# Patient Record
Sex: MALE | Race: BLACK OR AFRICAN AMERICAN | Employment: UNEMPLOYED | URBAN - METROPOLITAN AREA
[De-identification: names, ages, dates, MRNs, and addresses within clinical notes are randomized per-mention and may not be internally consistent; named-entity substitution may affect disease eponyms.]

---

## 2018-12-20 ENCOUNTER — ANESTHESIA EVENT (OUTPATIENT)
Dept: SURGERY | Age: 73
DRG: 240 | End: 2018-12-20
Payer: MEDICARE

## 2018-12-21 ENCOUNTER — HOSPITAL ENCOUNTER (INPATIENT)
Age: 73
LOS: 4 days | Discharge: NURSING, REHAB OR LONG-TERM CARE | DRG: 240 | End: 2018-12-25
Attending: SURGERY | Admitting: SURGERY
Payer: MEDICARE

## 2018-12-21 ENCOUNTER — ANESTHESIA (OUTPATIENT)
Dept: SURGERY | Age: 73
DRG: 240 | End: 2018-12-21
Payer: MEDICARE

## 2018-12-21 DIAGNOSIS — I70.229 CRITICAL LOWER LIMB ISCHEMIA (HCC): Primary | ICD-10-CM

## 2018-12-21 LAB
ANION GAP SERPL CALC-SCNC: 11 MMOL/L (ref 3–18)
ATRIAL RATE: 86 BPM
BASOPHILS # BLD: 0 K/UL (ref 0–0.1)
BASOPHILS NFR BLD: 0 % (ref 0–2)
BUN BLD-MCNC: 29 MG/DL (ref 7–18)
BUN SERPL-MCNC: 28 MG/DL (ref 7–18)
BUN/CREAT SERPL: 20 (ref 12–20)
CALCIUM SERPL-MCNC: 8.9 MG/DL (ref 8.5–10.1)
CALCULATED P AXIS, ECG09: 63 DEGREES
CALCULATED R AXIS, ECG10: -50 DEGREES
CALCULATED T AXIS, ECG11: 22 DEGREES
CHLORIDE BLD-SCNC: 94 MMOL/L (ref 100–108)
CHLORIDE SERPL-SCNC: 94 MMOL/L (ref 100–108)
CO2 SERPL-SCNC: 22 MMOL/L (ref 21–32)
CREAT SERPL-MCNC: 1.43 MG/DL (ref 0.6–1.3)
DIAGNOSIS, 93000: NORMAL
DIFFERENTIAL METHOD BLD: ABNORMAL
EOSINOPHIL # BLD: 0 K/UL (ref 0–0.4)
EOSINOPHIL NFR BLD: 0 % (ref 0–5)
ERYTHROCYTE [DISTWIDTH] IN BLOOD BY AUTOMATED COUNT: 12.9 % (ref 11.6–14.5)
GLUCOSE BLD STRIP.AUTO-MCNC: 103 MG/DL (ref 74–106)
GLUCOSE BLD STRIP.AUTO-MCNC: 111 MG/DL (ref 70–110)
GLUCOSE BLD STRIP.AUTO-MCNC: 116 MG/DL (ref 70–110)
GLUCOSE SERPL-MCNC: 97 MG/DL (ref 74–99)
HBA1C MFR BLD: 5.6 % (ref 4.2–5.6)
HCT VFR BLD AUTO: 31.2 % (ref 36–48)
HCT VFR BLD CALC: 32 % (ref 36–49)
HGB BLD-MCNC: 10.9 G/DL (ref 12–16)
HGB BLD-MCNC: 10.9 G/DL (ref 13–16)
LYMPHOCYTES # BLD: 1.6 K/UL (ref 0.9–3.6)
LYMPHOCYTES NFR BLD: 14 % (ref 21–52)
MCH RBC QN AUTO: 31.9 PG (ref 24–34)
MCHC RBC AUTO-ENTMCNC: 34.9 G/DL (ref 31–37)
MCV RBC AUTO: 91.2 FL (ref 74–97)
MONOCYTES # BLD: 1.4 K/UL (ref 0.05–1.2)
MONOCYTES NFR BLD: 12 % (ref 3–10)
NEUTS SEG # BLD: 8.5 K/UL (ref 1.8–8)
NEUTS SEG NFR BLD: 74 % (ref 40–73)
P-R INTERVAL, ECG05: 192 MS
PLATELET # BLD AUTO: 297 K/UL (ref 135–420)
PMV BLD AUTO: 8.9 FL (ref 9.2–11.8)
POTASSIUM BLD-SCNC: 4.1 MMOL/L (ref 3.5–5.5)
POTASSIUM SERPL-SCNC: 4.3 MMOL/L (ref 3.5–5.5)
Q-T INTERVAL, ECG07: 402 MS
QRS DURATION, ECG06: 104 MS
QTC CALCULATION (BEZET), ECG08: 481 MS
RBC # BLD AUTO: 3.42 M/UL (ref 4.7–5.5)
SODIUM BLD-SCNC: 128 MMOL/L (ref 136–145)
SODIUM SERPL-SCNC: 127 MMOL/L (ref 136–145)
VENTRICULAR RATE, ECG03: 86 BPM
WBC # BLD AUTO: 11.5 K/UL (ref 4.6–13.2)

## 2018-12-21 PROCEDURE — 76060000034 HC ANESTHESIA 1.5 TO 2 HR: Performed by: SURGERY

## 2018-12-21 PROCEDURE — 77030002996 HC SUT SLK J&J -A: Performed by: SURGERY

## 2018-12-21 PROCEDURE — 93005 ELECTROCARDIOGRAM TRACING: CPT

## 2018-12-21 PROCEDURE — 88311 DECALCIFY TISSUE: CPT

## 2018-12-21 PROCEDURE — 74011250636 HC RX REV CODE- 250/636: Performed by: NURSE ANESTHETIST, CERTIFIED REGISTERED

## 2018-12-21 PROCEDURE — 0Y6D0Z3 DETACHMENT AT LEFT UPPER LEG, LOW, OPEN APPROACH: ICD-10-PCS | Performed by: SURGERY

## 2018-12-21 PROCEDURE — 74011250636 HC RX REV CODE- 250/636: Performed by: SURGERY

## 2018-12-21 PROCEDURE — 77030008462 HC STPLR SKN PROX J&J -A: Performed by: SURGERY

## 2018-12-21 PROCEDURE — 77030011267 HC ELECTRD BLD COVD -A: Performed by: SURGERY

## 2018-12-21 PROCEDURE — 88307 TISSUE EXAM BY PATHOLOGIST: CPT

## 2018-12-21 PROCEDURE — 74011250636 HC RX REV CODE- 250/636

## 2018-12-21 PROCEDURE — 74011000272 HC RX REV CODE- 272: Performed by: SURGERY

## 2018-12-21 PROCEDURE — 84295 ASSAY OF SERUM SODIUM: CPT

## 2018-12-21 PROCEDURE — 77030029099 HC BN WAX SSPC -A: Performed by: SURGERY

## 2018-12-21 PROCEDURE — 65270000029 HC RM PRIVATE

## 2018-12-21 PROCEDURE — 76210000016 HC OR PH I REC 1 TO 1.5 HR: Performed by: SURGERY

## 2018-12-21 PROCEDURE — 74011250636 HC RX REV CODE- 250/636: Performed by: ANESTHESIOLOGY

## 2018-12-21 PROCEDURE — 85025 COMPLETE CBC W/AUTO DIFF WBC: CPT

## 2018-12-21 PROCEDURE — 36415 COLL VENOUS BLD VENIPUNCTURE: CPT

## 2018-12-21 PROCEDURE — 77030031139 HC SUT VCRL2 J&J -A: Performed by: SURGERY

## 2018-12-21 PROCEDURE — 74011250637 HC RX REV CODE- 250/637: Performed by: SURGERY

## 2018-12-21 PROCEDURE — 74011250637 HC RX REV CODE- 250/637: Performed by: NURSE ANESTHETIST, CERTIFIED REGISTERED

## 2018-12-21 PROCEDURE — 74011000250 HC RX REV CODE- 250: Performed by: SURGERY

## 2018-12-21 PROCEDURE — 74011250637 HC RX REV CODE- 250/637: Performed by: NURSE PRACTITIONER

## 2018-12-21 PROCEDURE — 77030027138 HC INCENT SPIROMETER -A

## 2018-12-21 PROCEDURE — 82962 GLUCOSE BLOOD TEST: CPT

## 2018-12-21 PROCEDURE — 77030018836 HC SOL IRR NACL ICUM -A: Performed by: SURGERY

## 2018-12-21 PROCEDURE — 77030032490 HC SLV COMPR SCD KNE COVD -B: Performed by: SURGERY

## 2018-12-21 PROCEDURE — 80048 BASIC METABOLIC PNL TOTAL CA: CPT

## 2018-12-21 PROCEDURE — 76010000153 HC OR TIME 1.5 TO 2 HR: Performed by: SURGERY

## 2018-12-21 PROCEDURE — 77030012510 HC MSK AIRWY LMA TELE -B: Performed by: ANESTHESIOLOGY

## 2018-12-21 PROCEDURE — 83036 HEMOGLOBIN GLYCOSYLATED A1C: CPT

## 2018-12-21 RX ORDER — DEXTROSE MONOHYDRATE 25 G/50ML
25-50 INJECTION, SOLUTION INTRAVENOUS AS NEEDED
Status: DISCONTINUED | OUTPATIENT
Start: 2018-12-21 | End: 2018-12-21 | Stop reason: SDUPTHER

## 2018-12-21 RX ORDER — CEFAZOLIN SODIUM 2 G/50ML
2 SOLUTION INTRAVENOUS
Status: COMPLETED | OUTPATIENT
Start: 2018-12-21 | End: 2018-12-21

## 2018-12-21 RX ORDER — SODIUM CHLORIDE, SODIUM LACTATE, POTASSIUM CHLORIDE, CALCIUM CHLORIDE 600; 310; 30; 20 MG/100ML; MG/100ML; MG/100ML; MG/100ML
INJECTION, SOLUTION INTRAVENOUS
Status: DISCONTINUED | OUTPATIENT
Start: 2018-12-21 | End: 2018-12-21 | Stop reason: HOSPADM

## 2018-12-21 RX ORDER — PHENYTOIN SODIUM 100 MG/1
100 CAPSULE, EXTENDED RELEASE ORAL DAILY
Status: DISCONTINUED | OUTPATIENT
Start: 2018-12-22 | End: 2018-12-21

## 2018-12-21 RX ORDER — PROPOFOL 10 MG/ML
INJECTION, EMULSION INTRAVENOUS AS NEEDED
Status: DISCONTINUED | OUTPATIENT
Start: 2018-12-21 | End: 2018-12-21 | Stop reason: HOSPADM

## 2018-12-21 RX ORDER — CALCIUM CARBONATE 200(500)MG
200 TABLET,CHEWABLE ORAL
Status: DISCONTINUED | OUTPATIENT
Start: 2018-12-21 | End: 2018-12-22 | Stop reason: RX

## 2018-12-21 RX ORDER — INSULIN LISPRO 100 [IU]/ML
INJECTION, SOLUTION INTRAVENOUS; SUBCUTANEOUS
Status: DISCONTINUED | OUTPATIENT
Start: 2018-12-21 | End: 2018-12-25 | Stop reason: HOSPADM

## 2018-12-21 RX ORDER — HYDROMORPHONE HYDROCHLORIDE 2 MG/ML
0.5 INJECTION, SOLUTION INTRAMUSCULAR; INTRAVENOUS; SUBCUTANEOUS
Status: DISCONTINUED | OUTPATIENT
Start: 2018-12-21 | End: 2018-12-21

## 2018-12-21 RX ORDER — OXYCODONE AND ACETAMINOPHEN 5; 325 MG/1; MG/1
1 TABLET ORAL
Status: DISCONTINUED | OUTPATIENT
Start: 2018-12-21 | End: 2018-12-25 | Stop reason: HOSPADM

## 2018-12-21 RX ORDER — GABAPENTIN 400 MG/1
400 CAPSULE ORAL 3 TIMES DAILY
Status: DISCONTINUED | OUTPATIENT
Start: 2018-12-21 | End: 2018-12-22 | Stop reason: RX

## 2018-12-21 RX ORDER — MAGNESIUM SULFATE 100 %
4 CRYSTALS MISCELLANEOUS AS NEEDED
Status: DISCONTINUED | OUTPATIENT
Start: 2018-12-21 | End: 2018-12-21 | Stop reason: HOSPADM

## 2018-12-21 RX ORDER — OXYCODONE AND ACETAMINOPHEN 7.5; 325 MG/1; MG/1
TABLET ORAL
COMMUNITY
End: 2018-12-25

## 2018-12-21 RX ORDER — OXYCODONE AND ACETAMINOPHEN 5; 325 MG/1; MG/1
2 TABLET ORAL
Status: DISCONTINUED | OUTPATIENT
Start: 2018-12-21 | End: 2018-12-25 | Stop reason: HOSPADM

## 2018-12-21 RX ORDER — HYDROMORPHONE HYDROCHLORIDE 2 MG/ML
0.5 INJECTION, SOLUTION INTRAMUSCULAR; INTRAVENOUS; SUBCUTANEOUS
Status: DISCONTINUED | OUTPATIENT
Start: 2018-12-21 | End: 2018-12-21 | Stop reason: HOSPADM

## 2018-12-21 RX ORDER — CARVEDILOL 25 MG/1
25 TABLET ORAL 2 TIMES DAILY WITH MEALS
Status: DISCONTINUED | OUTPATIENT
Start: 2018-12-22 | End: 2018-12-22 | Stop reason: RX

## 2018-12-21 RX ORDER — ALLOPURINOL 100 MG/1
100 TABLET ORAL DAILY
Status: DISCONTINUED | OUTPATIENT
Start: 2018-12-22 | End: 2018-12-22 | Stop reason: RX

## 2018-12-21 RX ORDER — FUROSEMIDE 20 MG/1
TABLET ORAL DAILY
COMMUNITY

## 2018-12-21 RX ORDER — CAPSAICIN 0.03 G/100G
CREAM TOPICAL 3 TIMES DAILY
COMMUNITY

## 2018-12-21 RX ORDER — CARVEDILOL 25 MG/1
25 TABLET ORAL 2 TIMES DAILY WITH MEALS
COMMUNITY

## 2018-12-21 RX ORDER — ATORVASTATIN CALCIUM 40 MG/1
40 TABLET, FILM COATED ORAL
Status: DISCONTINUED | OUTPATIENT
Start: 2018-12-22 | End: 2018-12-21

## 2018-12-21 RX ORDER — LABETALOL HYDROCHLORIDE 5 MG/ML
10 INJECTION, SOLUTION INTRAVENOUS AS NEEDED
Status: DISCONTINUED | OUTPATIENT
Start: 2018-12-21 | End: 2018-12-21 | Stop reason: HOSPADM

## 2018-12-21 RX ORDER — AMLODIPINE BESYLATE 10 MG/1
10 TABLET ORAL DAILY
Status: DISCONTINUED | OUTPATIENT
Start: 2018-12-22 | End: 2018-12-22 | Stop reason: RX

## 2018-12-21 RX ORDER — MAGNESIUM SULFATE 100 %
4 CRYSTALS MISCELLANEOUS AS NEEDED
Status: DISCONTINUED | OUTPATIENT
Start: 2018-12-21 | End: 2018-12-21 | Stop reason: SDUPTHER

## 2018-12-21 RX ORDER — PHENYTOIN SODIUM 100 MG/1
100 CAPSULE, EXTENDED RELEASE ORAL 3 TIMES DAILY
Status: DISCONTINUED | OUTPATIENT
Start: 2018-12-21 | End: 2018-12-22 | Stop reason: RX

## 2018-12-21 RX ORDER — LANOLIN ALCOHOL/MO/W.PET/CERES
1 CREAM (GRAM) TOPICAL
COMMUNITY

## 2018-12-21 RX ORDER — FUROSEMIDE 20 MG/1
20 TABLET ORAL DAILY
Status: DISCONTINUED | OUTPATIENT
Start: 2018-12-22 | End: 2018-12-22 | Stop reason: RX

## 2018-12-21 RX ORDER — OXYCODONE AND ACETAMINOPHEN 5; 325 MG/1; MG/1
1 TABLET ORAL AS NEEDED
Status: DISCONTINUED | OUTPATIENT
Start: 2018-12-21 | End: 2018-12-21 | Stop reason: HOSPADM

## 2018-12-21 RX ORDER — DEXTROSE MONOHYDRATE 25 G/50ML
25-50 INJECTION, SOLUTION INTRAVENOUS AS NEEDED
Status: DISCONTINUED | OUTPATIENT
Start: 2018-12-21 | End: 2018-12-21 | Stop reason: HOSPADM

## 2018-12-21 RX ORDER — ONDANSETRON 2 MG/ML
4 INJECTION INTRAMUSCULAR; INTRAVENOUS
Status: DISCONTINUED | OUTPATIENT
Start: 2018-12-21 | End: 2018-12-21 | Stop reason: HOSPADM

## 2018-12-21 RX ORDER — SODIUM CHLORIDE, SODIUM LACTATE, POTASSIUM CHLORIDE, CALCIUM CHLORIDE 600; 310; 30; 20 MG/100ML; MG/100ML; MG/100ML; MG/100ML
50 INJECTION, SOLUTION INTRAVENOUS CONTINUOUS
Status: DISCONTINUED | OUTPATIENT
Start: 2018-12-21 | End: 2018-12-21 | Stop reason: HOSPADM

## 2018-12-21 RX ORDER — PHENYTOIN SODIUM 100 MG/1
100 CAPSULE, EXTENDED RELEASE ORAL 3 TIMES DAILY
Status: DISCONTINUED | OUTPATIENT
Start: 2018-12-21 | End: 2018-12-21

## 2018-12-21 RX ORDER — CLOPIDOGREL BISULFATE 75 MG/1
TABLET ORAL
COMMUNITY

## 2018-12-21 RX ORDER — GUAIFENESIN 600 MG/1
600 TABLET, EXTENDED RELEASE ORAL EVERY 12 HOURS
Status: DISCONTINUED | OUTPATIENT
Start: 2018-12-21 | End: 2018-12-22 | Stop reason: RX

## 2018-12-21 RX ORDER — DIPHENHYDRAMINE HYDROCHLORIDE 50 MG/ML
25 INJECTION, SOLUTION INTRAMUSCULAR; INTRAVENOUS
Status: DISCONTINUED | OUTPATIENT
Start: 2018-12-21 | End: 2018-12-21 | Stop reason: HOSPADM

## 2018-12-21 RX ORDER — FENTANYL CITRATE 50 UG/ML
INJECTION, SOLUTION INTRAMUSCULAR; INTRAVENOUS AS NEEDED
Status: DISCONTINUED | OUTPATIENT
Start: 2018-12-21 | End: 2018-12-21 | Stop reason: HOSPADM

## 2018-12-21 RX ORDER — HYDROMORPHONE HYDROCHLORIDE 1 MG/ML
INJECTION, SOLUTION INTRAMUSCULAR; INTRAVENOUS; SUBCUTANEOUS AS NEEDED
Status: DISCONTINUED | OUTPATIENT
Start: 2018-12-21 | End: 2018-12-21 | Stop reason: HOSPADM

## 2018-12-21 RX ORDER — GABAPENTIN 400 MG/1
400 CAPSULE ORAL DAILY
COMMUNITY

## 2018-12-21 RX ORDER — LABETALOL HCL 20 MG/4 ML
SYRINGE (ML) INTRAVENOUS
Status: COMPLETED
Start: 2018-12-21 | End: 2018-12-21

## 2018-12-21 RX ORDER — ONDANSETRON 2 MG/ML
INJECTION INTRAMUSCULAR; INTRAVENOUS AS NEEDED
Status: DISCONTINUED | OUTPATIENT
Start: 2018-12-21 | End: 2018-12-21 | Stop reason: HOSPADM

## 2018-12-21 RX ORDER — TAMSULOSIN HYDROCHLORIDE 0.4 MG/1
0.4 CAPSULE ORAL DAILY
Status: DISCONTINUED | OUTPATIENT
Start: 2018-12-22 | End: 2018-12-22 | Stop reason: RX

## 2018-12-21 RX ORDER — FAMOTIDINE 20 MG/1
20 TABLET, FILM COATED ORAL ONCE
Status: COMPLETED | OUTPATIENT
Start: 2018-12-21 | End: 2018-12-21

## 2018-12-21 RX ORDER — GABAPENTIN 400 MG/1
400 CAPSULE ORAL 3 TIMES DAILY
Status: DISCONTINUED | OUTPATIENT
Start: 2018-12-21 | End: 2018-12-21

## 2018-12-21 RX ORDER — ERGOCALCIFEROL 1.25 MG/1
50000 CAPSULE ORAL
COMMUNITY

## 2018-12-21 RX ORDER — LANOLIN ALCOHOL/MO/W.PET/CERES
1 CREAM (GRAM) TOPICAL
Status: DISCONTINUED | OUTPATIENT
Start: 2018-12-22 | End: 2018-12-22 | Stop reason: RX

## 2018-12-21 RX ORDER — AMLODIPINE BESYLATE 5 MG/1
5 TABLET ORAL DAILY
Status: DISCONTINUED | OUTPATIENT
Start: 2018-12-22 | End: 2018-12-21

## 2018-12-21 RX ORDER — ALLOPURINOL 100 MG/1
TABLET ORAL DAILY
COMMUNITY

## 2018-12-21 RX ORDER — IPRATROPIUM BROMIDE AND ALBUTEROL SULFATE 2.5; .5 MG/3ML; MG/3ML
3 SOLUTION RESPIRATORY (INHALATION)
Status: DISCONTINUED | OUTPATIENT
Start: 2018-12-21 | End: 2018-12-25 | Stop reason: HOSPADM

## 2018-12-21 RX ORDER — FENTANYL CITRATE 50 UG/ML
25 INJECTION, SOLUTION INTRAMUSCULAR; INTRAVENOUS
Status: DISCONTINUED | OUTPATIENT
Start: 2018-12-21 | End: 2018-12-21 | Stop reason: HOSPADM

## 2018-12-21 RX ORDER — INSULIN LISPRO 100 [IU]/ML
INJECTION, SOLUTION INTRAVENOUS; SUBCUTANEOUS ONCE
Status: DISCONTINUED | OUTPATIENT
Start: 2018-12-21 | End: 2018-12-21 | Stop reason: SDUPTHER

## 2018-12-21 RX ORDER — INSULIN LISPRO 100 [IU]/ML
INJECTION, SOLUTION INTRAVENOUS; SUBCUTANEOUS ONCE
Status: DISCONTINUED | OUTPATIENT
Start: 2018-12-21 | End: 2018-12-21 | Stop reason: HOSPADM

## 2018-12-21 RX ORDER — HYDRALAZINE HYDROCHLORIDE 25 MG/1
25 TABLET, FILM COATED ORAL 3 TIMES DAILY
Status: DISCONTINUED | OUTPATIENT
Start: 2018-12-22 | End: 2018-12-22 | Stop reason: RX

## 2018-12-21 RX ORDER — DOCUSATE SODIUM 100 MG/1
100 CAPSULE, LIQUID FILLED ORAL DAILY
Status: DISCONTINUED | OUTPATIENT
Start: 2018-12-22 | End: 2018-12-22 | Stop reason: RX

## 2018-12-21 RX ORDER — LEVETIRACETAM 500 MG/1
500 TABLET ORAL 2 TIMES DAILY
Status: DISCONTINUED | OUTPATIENT
Start: 2018-12-22 | End: 2018-12-21

## 2018-12-21 RX ORDER — POTASSIUM CITRATE 10 MEQ/1
TABLET, EXTENDED RELEASE ORAL
COMMUNITY

## 2018-12-21 RX ORDER — MEMANTINE HYDROCHLORIDE 5 MG/1
10 TABLET ORAL DAILY
Status: DISCONTINUED | OUTPATIENT
Start: 2018-12-22 | End: 2018-12-22 | Stop reason: RX

## 2018-12-21 RX ORDER — LIDOCAINE HYDROCHLORIDE 20 MG/ML
INJECTION, SOLUTION EPIDURAL; INFILTRATION; INTRACAUDAL; PERINEURAL AS NEEDED
Status: DISCONTINUED | OUTPATIENT
Start: 2018-12-21 | End: 2018-12-21 | Stop reason: HOSPADM

## 2018-12-21 RX ORDER — ATORVASTATIN CALCIUM 10 MG/1
20 TABLET, FILM COATED ORAL
Status: DISCONTINUED | OUTPATIENT
Start: 2018-12-22 | End: 2018-12-22 | Stop reason: RX

## 2018-12-21 RX ORDER — MEMANTINE HYDROCHLORIDE 10 MG/1
TABLET ORAL DAILY
COMMUNITY

## 2018-12-21 RX ADMIN — HYDROMORPHONE HYDROCHLORIDE 0.5 MG: 2 INJECTION INTRAMUSCULAR; INTRAVENOUS; SUBCUTANEOUS at 18:55

## 2018-12-21 RX ADMIN — OXYCODONE AND ACETAMINOPHEN 2 TABLET: 5; 325 TABLET ORAL at 22:07

## 2018-12-21 RX ADMIN — SODIUM CHLORIDE, SODIUM LACTATE, POTASSIUM CHLORIDE, AND CALCIUM CHLORIDE 50 ML/HR: 600; 310; 30; 20 INJECTION, SOLUTION INTRAVENOUS at 10:33

## 2018-12-21 RX ADMIN — SODIUM CHLORIDE, SODIUM LACTATE, POTASSIUM CHLORIDE, CALCIUM CHLORIDE: 600; 310; 30; 20 INJECTION, SOLUTION INTRAVENOUS at 17:25

## 2018-12-21 RX ADMIN — FENTANYL CITRATE 50 MCG: 50 INJECTION, SOLUTION INTRAMUSCULAR; INTRAVENOUS at 17:23

## 2018-12-21 RX ADMIN — CEFAZOLIN SODIUM 2 G: 2 SOLUTION INTRAVENOUS at 17:06

## 2018-12-21 RX ADMIN — LABETALOL 20 MG/4 ML (5 MG/ML) INTRAVENOUS SYRINGE 10 MG: at 19:08

## 2018-12-21 RX ADMIN — ONDANSETRON 4 MG: 2 INJECTION INTRAMUSCULAR; INTRAVENOUS at 18:00

## 2018-12-21 RX ADMIN — GUAIFENESIN 600 MG: 600 TABLET, EXTENDED RELEASE ORAL at 22:06

## 2018-12-21 RX ADMIN — HYDROMORPHONE HYDROCHLORIDE 0.5 MG: 1 INJECTION, SOLUTION INTRAMUSCULAR; INTRAVENOUS; SUBCUTANEOUS at 17:26

## 2018-12-21 RX ADMIN — HYDROMORPHONE HYDROCHLORIDE 0.5 MG: 2 INJECTION INTRAMUSCULAR; INTRAVENOUS; SUBCUTANEOUS at 18:45

## 2018-12-21 RX ADMIN — DIPHENHYDRAMINE HYDROCHLORIDE 25 MG: 50 INJECTION, SOLUTION INTRAMUSCULAR; INTRAVENOUS at 18:37

## 2018-12-21 RX ADMIN — LIDOCAINE HYDROCHLORIDE 40 MG: 20 INJECTION, SOLUTION EPIDURAL; INFILTRATION; INTRACAUDAL; PERINEURAL at 16:59

## 2018-12-21 RX ADMIN — HYDROMORPHONE HYDROCHLORIDE 0.5 MG: 2 INJECTION INTRAMUSCULAR; INTRAVENOUS; SUBCUTANEOUS at 18:35

## 2018-12-21 RX ADMIN — HYDROMORPHONE HYDROCHLORIDE 0.5 MG: 1 INJECTION, SOLUTION INTRAMUSCULAR; INTRAVENOUS; SUBCUTANEOUS at 17:40

## 2018-12-21 RX ADMIN — PROPOFOL 150 MG: 10 INJECTION, EMULSION INTRAVENOUS at 16:59

## 2018-12-21 RX ADMIN — FENTANYL CITRATE 50 MCG: 50 INJECTION, SOLUTION INTRAMUSCULAR; INTRAVENOUS at 16:59

## 2018-12-21 RX ADMIN — FAMOTIDINE 20 MG: 20 TABLET ORAL at 10:42

## 2018-12-21 NOTE — CONSULTS
2 [facility]  Hospitalist Division    Consult Note    Patient: Lennie Gates MRN: [id number]  [facility]: [id number]    YOB: 1945  Age: 74 y.o. Sex: male    DOA: 12/21/2018 LOS:  LOS: 0 days        Requesting Physician:  Dr. Ruth Person   Reason for Consultation:  Medical Management    Chief Complaint:  L BKA vs L AKA     Assessment/Plan     Patient Active Problem List   Diagnosis Code    PAD (peripheral artery disease) ([facility] I73.9       A/P:    L BKA vs. L AKA   -defer primary  -pain control  -PT  -ICS     DM II  -accuchecks  -SSI  -lantus (add tomorrow if needed)   -diabetic diet when PO able     HTN   -monitor BP  -home medications restart tomorrow    Hyperlipidemia  -statin     PAD  -restart plavix per surgery      Seizures   -home medications  -seizure precautions  -monitor    Tobacco abuse  -advise cessation   -nicoderm patch if needed     DVT px   -defer primary    -We appreciate the consultation for medical management and appreciate being able to be involved with their care during hospitalization. HPI:     Lennie Gates is a 74 y.o. male who presents to the hospital to undergo an elective L BKA vs L AKA per vascular surgery. We were consulted to assist in medical   Management while the patient is hospitalized.      Past Medical History:   Diagnosis Date    Anemia     Arthropathy     BPH (benign prostatic hypertrophy)     Diabetes mellitus (HCC)     Difficulty urinating     DVT (deep venous thrombosis) (HCC)     Elevated cholesterol     Esophageal reflux     GERD (gastroesophageal reflux disease)     Gout     Heartburn     Hypercholesteremia     Hyperlipidemia     Hypertension     Hypertrophy of prostate with urinary obstruction and other lower urinary tract symptoms (LUTS)     Hyponatremia     Loose, teeth     Nocturia     PAD (peripheral artery disease) (HCC)     Peptic ulcer of stomach     Pneumonia     Prostate disease     Seizures ([facility]     Unspecified epilepsy without mention of intractable epilepsy        Past Surgical History:   Procedure Laterality Date    HX CATARACT REMOVAL      HX COLONOSCOPY      HX CYST REMOVAL      HX OTHER SURGICAL  04/11/2008 - Dr. Deng Flores    lower extremity bypass surgery    HX SMALL BOWEL RESECTION         Family History   Problem Relation Age of Onset    Cancer Mother        Social History     Socioeconomic History    Marital status: SINGLE     Spouse name: Not on file    Number of children: Not on file    Years of education: Not on file    Highest education level: Not on file   Tobacco Use    Smoking status: Current Every Day Smoker     Packs/day: 0.30     Types: Cigarettes    Smokeless tobacco: Never Used   Substance and Sexual Activity    Alcohol use: No    Drug use: No       Prior to Admission medications    Medication Sig Start Date End Date Taking? Authorizing Provider   allopurinol (ZYLOPRIM) 100 mg tablet Take  by mouth daily. Yes Provider, Historical   calcium citrate-vitamin d3 (CITRACAL+D) 315-200 mg-unit tab Take 1 Tab by mouth daily (with breakfast). Yes Provider, Historical   capsicum oleoresin 0.025 % topical cream Apply  to affected area three (3) times daily. Yes Provider, Historical   carvedilol (COREG) 25 mg tablet Take 25 mg by mouth two (2) times daily (with meals). Yes Provider, Historical   docusate sodium (COLACE) 50 mg capsule Take 50 mg by mouth two (2) times a day. Yes Provider, Historical   ergocalciferol (VITAMIN D2) 50,000 unit capsule Take 50,000 Units by mouth. Yes Provider, Historical   umeclidinium (INCRUSE ELLIPTA) 62.5 mcg/actuation inhaler Take 1 Puff by inhalation daily. Yes Provider, Historical   oxyCODONE-acetaminophen (PERCOCET) 7.5-325 mg per tablet Take  by mouth. Yes Provider, Historical   furosemide (LASIX) 20 mg tablet Take  by mouth daily. Yes Provider, Historical   memantine (NAMENDA) 10 mg tablet Take  by mouth daily. Yes Provider, Historical   gabapentin (NEURONTIN) 400 mg capsule Take 400 mg by mouth three (3) times daily. Yes Provider, Historical   clopidogrel (PLAVIX) 75 mg tab Take  by mouth. Yes Provider, Historical   potassium citrate (UROCIT-K10) 10 mEq (1,080 mg) TbER Take  by mouth. Yes Provider, Historical   tamsulosin (FLOMAX) 0.4 mg capsule Take 1 Cap by mouth daily. 7/25/13  Yes Antony Salamanca MD   colchicine (COLCRYS) 0.6 mg tablet Take 0.6 mg by mouth daily. Yes Provider, Historical   atorvastatin (LIPITOR) 40 mg tablet Take  by mouth daily. Yes Provider, Historical   phenytoin ER (DILANTIN) 100 mg ER capsule Take  by mouth. Yes Provider, Historical   hydrALAZINE (APRESOLINE) 25 mg tablet Take 25 mg by mouth three (3) times daily. Yes Provider, Historical   ranitidine (ZANTAC) 150 mg tablet Take 150 mg by mouth two (2) times a day. Yes Provider, Historical   levETIRAcetam (KEPPRA) 500 mg tablet Take  by mouth two (2) times a day. Yes Provider, Historical   amLODIPine (NORVASC) 5 mg tablet Take 5 mg by mouth daily. Provider, Historical   ferrous sulfate (FEOSOL) 325 mg (65 mg iron) tablet Take  by mouth Daily (before breakfast).       Provider, Historical       No Known Allergies    Review of Systems  - fever, - chills, - fatigue, - weight loss, - night sweats   - sore throat, - sinus congestion, - lymphadenopathy, - vision changes  - CP, -  palpitations  - dyspnea on exertion, - dyspnea at rest, - cough, - hemoptysis  - nausea, - vomiting, - diarrhea, - abdominal pain, - reflux, - dysphagia  - dysuria, - hematuria, - urinary frequency  - rash, - pruritis  - back pain, - neck pain, - myalgia, - arthralgia + leg pain L   - H/A, + numbness, + tingling, - weakness, - slurred speech    Physical Exam:      Visit Vitals  /77 (BP 1 Location: Left arm, BP Patient Position: At rest)   Pulse 87   Temp 97.7 °F (36.5 °C)   Resp 16   Ht 5' 4\" (1.626 m)   Wt 67.4 kg (148 lb 8 oz)   SpO2 100%   BMI 25.49 kg/m²       Physical Exam:  Gen: In general, this is a well nourished male in no acute distress. HEENT: Sclerae nonicteric. Oral mucous membranes moist.    Neck: Supple with midline trachea. CV: RRR without murmur or rub appreciated. Resp:Respirations are unlabored without use of accessory muscles. Lung fields bilaterally without wheezes or rhonchi. Abd: Soft, nontender, nondistended. Normoactive bowel sounds. Extrem: No pitting pretibial edema. Skin: Warm, no visible rashes. Neuro: Patient is alert, oriented, and cooperative. No obvious focal defects. Moves all 4 extremities. Labs Reviewed:    Recent Results (from the past 24 hour(s))   EKG, 12 LEAD, INITIAL    Collection Time: 12/21/18 10:12 AM   Result Value Ref Range    Ventricular Rate 86 BPM    Atrial Rate 86 BPM    P-R Interval 192 ms    QRS Duration 104 ms    Q-T Interval 402 ms    QTC Calculation (Bezet) 481 ms    Calculated P Axis 63 degrees    Calculated R Axis -50 degrees    Calculated T Axis 22 degrees    Diagnosis       Normal sinus rhythm  Left anterior fascicular block  Minimal voltage criteria for LVH, may be normal variant  Nonspecific ST and T wave abnormality  Prolonged QT  Abnormal ECG  No previous ECGs available     CBC WITH AUTOMATED DIFF    Collection Time: 12/21/18 10:30 AM   Result Value Ref Range    WBC 11.5 4.6 - 13.2 K/uL    RBC 3.42 (L) 4.70 - 5.50 M/uL    HGB 10.9 (L) 13.0 - 16.0 g/dL    HCT 31.2 (L) 36.0 - 48.0 %    MCV 91.2 74.0 - 97.0 FL    MCH 31.9 24.0 - 34.0 PG    MCHC 34.9 31.0 - 37.0 g/dL    RDW 12.9 11.6 - 14.5 %    PLATELET 794 355 - 785 K/uL    MPV 8.9 (L) 9.2 - 11.8 FL    NEUTROPHILS 74 (H) 40 - 73 %    LYMPHOCYTES 14 (L) 21 - 52 %    MONOCYTES 12 (H) 3 - 10 %    EOSINOPHILS 0 0 - 5 %    BASOPHILS 0 0 - 2 %    ABS. NEUTROPHILS 8.5 (H) 1.8 - 8.0 K/UL    ABS. LYMPHOCYTES 1.6 0.9 - 3.6 K/UL    ABS. MONOCYTES 1.4 (H) 0.05 - 1.2 K/UL    ABS. EOSINOPHILS 0.0 0.0 - 0.4 K/UL    ABS.  BASOPHILS 0.0 0.0 - 0.1 K/UL    DF AUTOMATED     POC 6 PLUS    Collection Time: 12/21/18 10:41 AM   Result Value Ref Range    Sodium,  (L) 136 - 145 MMOL/L    Potassium, POC 4.1 3.5 - 5.5 MMOL/L    Chloride, POC 94 (L) 100 - 108 MMOL/L    BUN, POC 29 (H) 7 - 18 MG/DL    Glucose,  74 - 106 MG/DL    Hematocrit, POC 32 (L) 36 - 49 %    Hemoglobin, POC 10.9 (L) 12 - 16 G/DL       Imaging Reviewed:

## 2018-12-21 NOTE — INTERVAL H&P NOTE
H&P Update:  Negro Jalloh was seen and examined. History and physical has been reviewed. The patient has been examined. There have been no significant clinical changes since the completion of the originally dated History and Physical. Here for L AKA.     Signed By: Grabiel Lomeli MD     December 21, 2018 3:26 PM

## 2018-12-22 PROBLEM — I10 HTN (HYPERTENSION): Status: ACTIVE | Noted: 2018-12-22

## 2018-12-22 PROBLEM — E11.9 DM (DIABETES MELLITUS) (HCC): Status: ACTIVE | Noted: 2018-12-22

## 2018-12-22 LAB
ANION GAP SERPL CALC-SCNC: 10 MMOL/L (ref 3–18)
BUN SERPL-MCNC: 15 MG/DL (ref 7–18)
BUN/CREAT SERPL: 18 (ref 12–20)
CALCIUM SERPL-MCNC: 8.1 MG/DL (ref 8.5–10.1)
CHLORIDE SERPL-SCNC: 99 MMOL/L (ref 100–108)
CO2 SERPL-SCNC: 24 MMOL/L (ref 21–32)
CREAT SERPL-MCNC: 0.82 MG/DL (ref 0.6–1.3)
ERYTHROCYTE [DISTWIDTH] IN BLOOD BY AUTOMATED COUNT: 13 % (ref 11.6–14.5)
GLUCOSE BLD STRIP.AUTO-MCNC: 106 MG/DL (ref 70–110)
GLUCOSE BLD STRIP.AUTO-MCNC: 119 MG/DL (ref 70–110)
GLUCOSE BLD STRIP.AUTO-MCNC: 130 MG/DL (ref 70–110)
GLUCOSE BLD STRIP.AUTO-MCNC: 96 MG/DL (ref 70–110)
GLUCOSE SERPL-MCNC: 86 MG/DL (ref 74–99)
HCT VFR BLD AUTO: 29.4 % (ref 36–48)
HGB BLD-MCNC: 9.9 G/DL (ref 13–16)
MCH RBC QN AUTO: 31.1 PG (ref 24–34)
MCHC RBC AUTO-ENTMCNC: 33.7 G/DL (ref 31–37)
MCV RBC AUTO: 92.5 FL (ref 74–97)
PLATELET # BLD AUTO: 278 K/UL (ref 135–420)
PMV BLD AUTO: 8.8 FL (ref 9.2–11.8)
POTASSIUM SERPL-SCNC: 4.2 MMOL/L (ref 3.5–5.5)
RBC # BLD AUTO: 3.18 M/UL (ref 4.7–5.5)
SODIUM SERPL-SCNC: 133 MMOL/L (ref 136–145)
WBC # BLD AUTO: 17.7 K/UL (ref 4.6–13.2)

## 2018-12-22 PROCEDURE — 85027 COMPLETE CBC AUTOMATED: CPT

## 2018-12-22 PROCEDURE — 77030011256 HC DRSG MEPILEX <16IN NO BORD MOLN -A

## 2018-12-22 PROCEDURE — 74011250637 HC RX REV CODE- 250/637: Performed by: SURGERY

## 2018-12-22 PROCEDURE — 80048 BASIC METABOLIC PNL TOTAL CA: CPT

## 2018-12-22 PROCEDURE — 65270000029 HC RM PRIVATE

## 2018-12-22 PROCEDURE — 77030010545

## 2018-12-22 PROCEDURE — 74011250636 HC RX REV CODE- 250/636: Performed by: HOSPITALIST

## 2018-12-22 PROCEDURE — 82962 GLUCOSE BLOOD TEST: CPT

## 2018-12-22 PROCEDURE — 36415 COLL VENOUS BLD VENIPUNCTURE: CPT

## 2018-12-22 PROCEDURE — 74011250637 HC RX REV CODE- 250/637: Performed by: NURSE PRACTITIONER

## 2018-12-22 RX ORDER — SODIUM CHLORIDE 9 MG/ML
75 INJECTION, SOLUTION INTRAVENOUS CONTINUOUS
Status: DISCONTINUED | OUTPATIENT
Start: 2018-12-22 | End: 2018-12-24

## 2018-12-22 RX ORDER — ATORVASTATIN CALCIUM 20 MG/1
20 TABLET, FILM COATED ORAL
Status: DISCONTINUED | OUTPATIENT
Start: 2018-12-23 | End: 2018-12-25 | Stop reason: HOSPADM

## 2018-12-22 RX ORDER — CALCIUM CARBONATE 200(500)MG
200 TABLET,CHEWABLE ORAL
Status: DISCONTINUED | OUTPATIENT
Start: 2018-12-22 | End: 2018-12-25 | Stop reason: HOSPADM

## 2018-12-22 RX ORDER — TAMSULOSIN HYDROCHLORIDE 0.4 MG/1
0.4 CAPSULE ORAL DAILY
Status: DISCONTINUED | OUTPATIENT
Start: 2018-12-22 | End: 2018-12-25 | Stop reason: HOSPADM

## 2018-12-22 RX ORDER — ALLOPURINOL 100 MG/1
100 TABLET ORAL DAILY
Status: DISCONTINUED | OUTPATIENT
Start: 2018-12-22 | End: 2018-12-25 | Stop reason: HOSPADM

## 2018-12-22 RX ORDER — CARVEDILOL 25 MG/1
25 TABLET ORAL 2 TIMES DAILY WITH MEALS
Status: DISCONTINUED | OUTPATIENT
Start: 2018-12-22 | End: 2018-12-22

## 2018-12-22 RX ORDER — GABAPENTIN 400 MG/1
400 CAPSULE ORAL 3 TIMES DAILY
Status: DISCONTINUED | OUTPATIENT
Start: 2018-12-22 | End: 2018-12-25 | Stop reason: HOSPADM

## 2018-12-22 RX ORDER — AMLODIPINE BESYLATE 10 MG/1
10 TABLET ORAL DAILY
Status: DISCONTINUED | OUTPATIENT
Start: 2018-12-22 | End: 2018-12-22

## 2018-12-22 RX ORDER — MEMANTINE HYDROCHLORIDE 5 MG/1
10 TABLET ORAL DAILY
Status: DISCONTINUED | OUTPATIENT
Start: 2018-12-22 | End: 2018-12-25 | Stop reason: HOSPADM

## 2018-12-22 RX ORDER — LANOLIN ALCOHOL/MO/W.PET/CERES
1 CREAM (GRAM) TOPICAL
Status: DISCONTINUED | OUTPATIENT
Start: 2018-12-22 | End: 2018-12-25 | Stop reason: HOSPADM

## 2018-12-22 RX ORDER — DOCUSATE SODIUM 100 MG/1
100 CAPSULE, LIQUID FILLED ORAL DAILY
Status: DISCONTINUED | OUTPATIENT
Start: 2018-12-22 | End: 2018-12-25 | Stop reason: HOSPADM

## 2018-12-22 RX ORDER — CALCIUM CARBONATE 200(500)MG
200 TABLET,CHEWABLE ORAL
Status: DISCONTINUED | OUTPATIENT
Start: 2018-12-22 | End: 2018-12-22 | Stop reason: RX

## 2018-12-22 RX ORDER — ATORVASTATIN CALCIUM 10 MG/1
20 TABLET, FILM COATED ORAL
Status: DISCONTINUED | OUTPATIENT
Start: 2018-12-22 | End: 2018-12-22 | Stop reason: CLARIF

## 2018-12-22 RX ORDER — CARVEDILOL 25 MG/1
25 TABLET ORAL 2 TIMES DAILY WITH MEALS
Status: DISCONTINUED | OUTPATIENT
Start: 2018-12-22 | End: 2018-12-25 | Stop reason: HOSPADM

## 2018-12-22 RX ORDER — FUROSEMIDE 20 MG/1
20 TABLET ORAL DAILY
Status: DISCONTINUED | OUTPATIENT
Start: 2018-12-22 | End: 2018-12-25 | Stop reason: HOSPADM

## 2018-12-22 RX ORDER — ALLOPURINOL 100 MG/1
100 TABLET ORAL DAILY
Status: DISCONTINUED | OUTPATIENT
Start: 2018-12-22 | End: 2018-12-22

## 2018-12-22 RX ORDER — HYDRALAZINE HYDROCHLORIDE 25 MG/1
25 TABLET, FILM COATED ORAL 3 TIMES DAILY
Status: DISCONTINUED | OUTPATIENT
Start: 2018-12-22 | End: 2018-12-25 | Stop reason: HOSPADM

## 2018-12-22 RX ORDER — GUAIFENESIN 600 MG/1
600 TABLET, EXTENDED RELEASE ORAL EVERY 12 HOURS
Status: DISCONTINUED | OUTPATIENT
Start: 2018-12-22 | End: 2018-12-25 | Stop reason: HOSPADM

## 2018-12-22 RX ORDER — PHENYTOIN SODIUM 100 MG/1
100 CAPSULE, EXTENDED RELEASE ORAL 3 TIMES DAILY
Status: DISCONTINUED | OUTPATIENT
Start: 2018-12-22 | End: 2018-12-25 | Stop reason: HOSPADM

## 2018-12-22 RX ORDER — AMLODIPINE BESYLATE 10 MG/1
10 TABLET ORAL DAILY
Status: DISCONTINUED | OUTPATIENT
Start: 2018-12-22 | End: 2018-12-25 | Stop reason: HOSPADM

## 2018-12-22 RX ADMIN — OXYCODONE AND ACETAMINOPHEN 1 TABLET: 5; 325 TABLET ORAL at 11:02

## 2018-12-22 RX ADMIN — HYDRALAZINE HYDROCHLORIDE 25 MG: 25 TABLET ORAL at 21:33

## 2018-12-22 RX ADMIN — PHENYTOIN SODIUM 100 MG: 100 CAPSULE ORAL at 08:54

## 2018-12-22 RX ADMIN — GABAPENTIN 400 MG: 400 CAPSULE ORAL at 00:15

## 2018-12-22 RX ADMIN — GABAPENTIN 400 MG: 400 CAPSULE ORAL at 08:54

## 2018-12-22 RX ADMIN — FERROUS SULFATE TAB 325 MG (65 MG ELEMENTAL FE) 325 MG: 325 (65 FE) TAB at 10:12

## 2018-12-22 RX ADMIN — ATORVASTATIN CALCIUM 20 MG: 20 TABLET, FILM COATED ORAL at 23:38

## 2018-12-22 RX ADMIN — GABAPENTIN 400 MG: 400 CAPSULE ORAL at 21:32

## 2018-12-22 RX ADMIN — GUAIFENESIN 600 MG: 600 TABLET, EXTENDED RELEASE ORAL at 08:53

## 2018-12-22 RX ADMIN — OXYCODONE AND ACETAMINOPHEN 2 TABLET: 5; 325 TABLET ORAL at 03:51

## 2018-12-22 RX ADMIN — CARVEDILOL 25 MG: 25 TABLET, FILM COATED ORAL at 10:12

## 2018-12-22 RX ADMIN — CARVEDILOL 25 MG: 25 TABLET, FILM COATED ORAL at 16:43

## 2018-12-22 RX ADMIN — LEVETIRACETAM 750 MG: 500 TABLET ORAL at 10:12

## 2018-12-22 RX ADMIN — TAMSULOSIN HYDROCHLORIDE 0.4 MG: 0.4 CAPSULE ORAL at 08:53

## 2018-12-22 RX ADMIN — LEVETIRACETAM 750 MG: 500 TABLET ORAL at 17:32

## 2018-12-22 RX ADMIN — GUAIFENESIN 600 MG: 600 TABLET, EXTENDED RELEASE ORAL at 21:33

## 2018-12-22 RX ADMIN — HYDRALAZINE HYDROCHLORIDE 25 MG: 25 TABLET ORAL at 10:13

## 2018-12-22 RX ADMIN — OXYCODONE AND ACETAMINOPHEN 1 TABLET: 5; 325 TABLET ORAL at 21:50

## 2018-12-22 RX ADMIN — PHENYTOIN SODIUM 100 MG: 100 CAPSULE ORAL at 14:34

## 2018-12-22 RX ADMIN — HYDRALAZINE HYDROCHLORIDE 25 MG: 25 TABLET ORAL at 16:44

## 2018-12-22 RX ADMIN — OXYCODONE AND ACETAMINOPHEN 1 TABLET: 5; 325 TABLET ORAL at 16:44

## 2018-12-22 RX ADMIN — AMLODIPINE BESYLATE 10 MG: 10 TABLET ORAL at 10:12

## 2018-12-22 RX ADMIN — SODIUM CHLORIDE 75 ML/HR: 900 INJECTION, SOLUTION INTRAVENOUS at 09:42

## 2018-12-22 RX ADMIN — PHENYTOIN SODIUM 100 MG: 100 CAPSULE ORAL at 00:15

## 2018-12-22 RX ADMIN — PHENYTOIN SODIUM 100 MG: 100 CAPSULE ORAL at 21:33

## 2018-12-22 RX ADMIN — MEMANTINE 10 MG: 5 TABLET ORAL at 10:13

## 2018-12-22 RX ADMIN — DOCUSATE SODIUM 100 MG: 100 CAPSULE, LIQUID FILLED ORAL at 10:12

## 2018-12-22 RX ADMIN — OXYCODONE AND ACETAMINOPHEN 1 TABLET: 5; 325 TABLET ORAL at 21:33

## 2018-12-22 RX ADMIN — SODIUM CHLORIDE 75 ML/HR: 900 INJECTION, SOLUTION INTRAVENOUS at 23:04

## 2018-12-22 RX ADMIN — FUROSEMIDE 20 MG: 20 TABLET ORAL at 10:12

## 2018-12-22 RX ADMIN — GUAIFENESIN 600 MG: 600 TABLET, EXTENDED RELEASE ORAL at 08:54

## 2018-12-22 RX ADMIN — ALLOPURINOL 100 MG: 100 TABLET ORAL at 10:12

## 2018-12-22 RX ADMIN — GABAPENTIN 400 MG: 400 CAPSULE ORAL at 14:34

## 2018-12-22 NOTE — PROGRESS NOTES
Problem: Falls - Risk of  Goal: *Absence of Falls  Document Elza Fall Risk and appropriate interventions in the flowsheet.   Outcome: Progressing Towards Goal  Fall Risk Interventions:  Mobility Interventions: Bed/chair exit alarm, Communicate number of staff needed for ambulation/transfer, Patient to call before getting OOB, PT Consult for mobility concerns, PT Consult for assist device competence, Strengthening exercises (ROM-active/passive)    Mentation Interventions: Bed/chair exit alarm, Door open when patient unattended, Evaluate medications/consider consulting pharmacy, Room close to nurse's station    Medication Interventions: Bed/chair exit alarm, Patient to call before getting OOB    Elimination Interventions: Bed/chair exit alarm, Call light in reach, Patient to call for help with toileting needs, Toileting schedule/hourly rounds

## 2018-12-22 NOTE — BRIEF OP NOTE
BRIEF OPERATIVE NOTE    Date of Procedure: 12/21/2018   Preoperative Diagnosis: LEFT SEVERE PERIPHERAL VASCULAR DISEASE    Postoperative Diagnosis: LEFT SEVERE PERIPHERAL VASCULAR DISEASE      Procedure(s):  left  above the knee amputation  Surgeon(s) and Role:     * Tod Barakat MD - Primary           Surgical Staff:  Edwar Cobb: Airam Spain RN  Scrub Tech-1: Tilford Barry  Surg Asst-1: Luis Alberto Certain  Event Time In Time Out   Incision Start 1721    Incision Close 1819      Anesthesia: General   Estimated Blood Loss: 250mL  Specimens:   ID Type Source Tests Collected by Time Destination   1 : LEFT ABOVE KNEE AMPUTATION Fresh   Tod Barakat MD 12/21/2018 1740 Pathology      Findings: viable  Muscle and tissue  Complications: none  Implants: * No implants in log *

## 2018-12-22 NOTE — PROGRESS NOTES
TRANSFER - IN REPORT:    Verbal report [facility] from [facility] on Emerita Mcgee  being received from PACU for routine post - op      Report consisted of patients Situation, Background, Assessment and   Recommendations(SBAR). Information from the following report(s) SBAR, Procedure Summary and MAR was reviewed with the receiving nurse. Opportunity for questions and clarification was provided. 2000 Received pt via bed awake and alert in NAD. Dressing to L leg c/d/i. SCD's applied to RLE. Oriented to call bell, phone and IS with pt giving return demonstration.

## 2018-12-22 NOTE — PROGRESS NOTES
[facility]  [address] [facility]. [address], [address] [facility]   Dr. Liliane Vargas, Dr. Jordyn Russell, Dr. Rita Hutchins, & Dr. Conrado Gilford  [phone] FAX# [phone]    Progress Note    Patient: David Lambert MRN: [id number]  SSN: [id number]    YOB: 1945  Age: 74 y.o. Sex: male      Admit Date: 12/21/2018    LOS: 1 day     Subjective:       POD 1 from Left AKA. No acute events. Moderate pain at stump site. Objective:     Vitals:    12/21/18 2005 12/22/18 0351 12/22/18 0600 12/22/18 0808   BP: 180/76 169/80  162/88   Pulse: 86 (!) 104  (!) 111   Resp: 11 18 20   Temp: 98.7 °F (37.1 °C) 100.4 °F (38 °C) 100.3 °F (37.9 °C) 99.6 °F (37.6 °C)   SpO2: 98%   99%   Weight:       Height:            Intake and Output:  Current Shift: No intake/output data recorded. Last three shifts: 12/20 1901 - 12/22 0700  In: 780 [P.O.:180; I.V.:600]  Out: 375 [Urine:375]    Physical Exam:   GENERAL: alert, cooperative, no distress, appears stated age  LYMPHATIC: Cervical, supraclavicular, and axillary nodes normal.   THROAT & NECK: normal and no erythema or exudates noted. LUNG: diminished breath sounds R base, L base  HEART: regular rate and rhythm, S1, S2 normal, no murmur, click, rub or gallop  ABDOMEN: soft, non-tender. Bowel sounds normal. No masses,  no organomegaly  EXTREMITIES:  edema RLE. Left AKA wound c/d/i. SKIN: Normal.  NEUROLOGIC: AOx3. Gait normal. Reflexes and motor strength normal and symmetric. Cranial nerves 2-12 and sensation grossly intact.   PSYCHIATRIC: non focal    Lab/Data Review:  BMP:   Lab Results   Component Value Date/Time     (L) 12/22/2018 08:35 AM    K 4.2 12/22/2018 08:35 AM    CL 99 (L) 12/22/2018 08:35 AM    CO2 24 12/22/2018 08:35 AM    AGAP 10 12/22/2018 08:35 AM    GLU 86 12/22/2018 08:35 AM    BUN 15 12/22/2018 08:35 AM    CREA 0.82 12/22/2018 08:35 AM    GFRAA >60 12/22/2018 08:35 AM    GFRNA >60 12/22/2018 08:35 AM     CBC:   Lab Results   Component Value Date/Time    WBC 17.7 (H) 12/22/2018 08:35 AM    HGB 9.9 (L) 12/22/2018 08:35 AM    HCT 29.4 (L) 12/22/2018 08:35 AM     12/22/2018 08:35 AM            Assessment:     Active Problems:    Critical lower limb ischemia (12/21/2018)        Plan:     Pt/ot  Regular diet  OOB to chair  Wound dressing change tomorrow. Po pain medication   DVT ppx.     Signed By: Nuzhat Esposito MD     December 22, 2018

## 2018-12-22 NOTE — PROGRESS NOTES
Bedside and Verbal shift change report given to LISA Bello (oncoming nurse) by Poncho Panda RN (offgoing nurse). Report included the following information SBAR, Kardex, Procedure Summary, Intake/Output and MAR.     1102-Complaint of pain 7 of 10 to LLE, prn medication given, will continue to monitor. 1135-Pain reassessment 3 of 10, NAD. Turned and repositioned. 1500-Dressing to to LLE reinforced clean dry and intact. , pt tolerated well. Will continue to monitor. 1644-Complaint of pain 8 of 10 to LLE, prn medication given. Repositioned. 1715-Pain reassessment, pt asleep in bed. NAD. Will continue to monitor. Bedside and Verbal shift change report given to Poncho Panda RN (oncoming nurse) by LISA Bello (offgoing nurse). Report included the following information SBAR, Kardex, Intake/Output and MAR.

## 2018-12-22 NOTE — PERIOP NOTES
1826 Pt received to PACU and connected to monitor. Bedside report given by Piotr Almazan RN. Vital signs stable. Nurse at bedside. Will continue to monitor.

## 2018-12-22 NOTE — MANAGEMENT PLAN
Discharge/Transition Planning    Interviewed patient. Verified demographics listed on face sheet with patient; all information correct. Address listed is brother in law home and where pt use to live. Pt is resident of [facility] and [place] Patient stated their PCP is Dr Vern Cruz at facility. Patient's NOK is brother in lawEli Patient dependent with ADLs prior to admission. States bed bound Discharge plan is to return to [facility] [address] [address]. Referral sent. Will need med transport      Patient has designated ___bro in law_____________________ to participate in his/her discharge plan and to receive any needed information. Name: Carlene Wong listed  Phone [phone] or [phone]    Care Management Interventions  PCP Verified by CM: Yes(Dr Stanley at [facility])  Mode of Transport at Discharge: BLS  Transition of Care Consult (CM Consult): SNF  Partner SNF: Yes  Current Support Network: Nursing Facility  Confirm Follow Up Transport: Other (see comment)  Plan discussed with Pt/Family/Caregiver: Yes  Freedom of Choice Offered: Yes  Discharge Location  Discharge Placement: [address]     Reason for Admission:   Lower limb ischemia                  RRAT Score:     16             Do you (patient/family) have any concerns for transition/discharge? Not at this time              Plan for utilizing home health:     no    Likelihood of readmission?    Yellow/moderate            Transition of Care Plan:      Return to [address] RN BSN  Outcomes Manager  Pager # [phone]

## 2018-12-22 NOTE — PROGRESS NOTES
Problem: Pressure Injury - Risk of  Goal: *Prevention of pressure injury  Document Lior Scale and appropriate interventions in the flowsheet.   Outcome: Progressing Towards Goal  Pressure Injury Interventions:   Reposition q2hrs     frequent rounds

## 2018-12-22 NOTE — ANESTHESIA POSTPROCEDURE EVALUATION
Procedure(s):  left  above the knee amputation.     Anesthesia Post Evaluation      Multimodal analgesia: multimodal analgesia used between 6 hours prior to anesthesia start to PACU discharge  Patient location during evaluation: bedside  Patient participation: complete - patient participated  Level of consciousness: awake  Pain score: 0  Pain management: adequate  Airway patency: patent  Anesthetic complications: no  Cardiovascular status: stable  Respiratory status: acceptable  Hydration status: acceptable  Post anesthesia nausea and vomiting:  controlled      Visit Vitals  /90   Pulse 91   Temp 36.7 °C (98.1 °F)   Resp 11   Ht 5' 4\" (1.626 m)   Wt 67.4 kg (148 lb 8 oz)   SpO2 100%   BMI 25.49 kg/m²

## 2018-12-22 NOTE — PROGRESS NOTES
conducted an initial consultation and Spiritual Assessment for Kelly Osuna, who is a 74 y. o.,male. Patients Primary Language is: Rossanaliz[place] Kiera. According to the patients EMR Oriental orthodox Affiliation is: [facility]. The reason the Patient came to the hospital is:   Patient Active Problem List    Diagnosis Date Noted    DM (diabetes mellitus) ([facility] 12/22/2018    HTN (hypertension) 12/22/2018    Critical lower limb ischemia 12/21/2018    PAD (peripheral artery disease) ([facility] 09/17/2012        The  provided the following Interventions:  Initiated a relationship of care and support. Provided information about Spiritual Care Services. Offered prayer and assurance of continued prayers on patient's behalf. The following outcomes were achieved:  Patient expressed gratitude for 's visit. Assessment:  There are no further spiritual or Protestant issues which require intervention at this time. Plan:  Chaplains will continue to follow and will provide pastoral care on an as needed/requested basis. Ernesto Mendez M.Div.   , [address] [facility]: [phone]/[facility]: [phone]

## 2018-12-22 NOTE — PROGRESS NOTES
Problem: Falls - Risk of  Goal: *Absence of Falls  Document Elza Fall Risk and appropriate interventions in the flowsheet. Outcome: Progressing Towards Goal  Fall Risk Interventions:  Mobility Interventions: Patient to call before getting OOB, Communicate number of staff needed for ambulation/transfer, Utilize walker, cane, or other assistive device    Mentation Interventions: Adequate sleep, hydration, pain control, Door open when patient unattended, Evaluate medications/consider consulting pharmacy, More frequent rounding    Medication Interventions: Evaluate medications/consider consulting pharmacy, Patient to call before getting OOB, Teach patient to arise slowly    Elimination Interventions: Call light in reach, Patient to call for help with toileting needs, Toilet paper/wipes in reach, Toileting schedule/hourly rounds             Problem: Pressure Injury - Risk of  Goal: *Prevention of pressure injury  Document Lior Scale and appropriate interventions in the flowsheet.   Outcome: Progressing Towards Goal  Pressure Injury Interventions:       Moisture Interventions: Absorbent underpads, Apply protective barrier, creams and emollients, Check for incontinence Q2 hours and as needed    Activity Interventions: Increase time out of bed, Pressure redistribution bed/mattress(bed type)    Mobility Interventions: HOB 30 degrees or less, Pressure redistribution bed/mattress (bed type)    Nutrition Interventions: Document food/fluid/supplement intake    Friction and Shear Interventions: HOB 30 degrees or less, Apply protective barrier, creams and emollients

## 2018-12-22 NOTE — PROGRESS NOTES
2200-Patient recently admitted from PACU. Noted to be more alert. Patient is a left AKA, Complain of pain. Pain meds given as ordered. Patient accepted and tolerated well. Dressing still intact in left leg. 0000-Pericare done. Condom cath placed per patient's consent. Primary Nurse Elsa Boogie RN and Bettina Cooks, RN performed a dual skin assessment on this patient Impairment noted- see wound doc flow sheet  Lior score is 15      0400- Noted patient to have removed dressing in left stump and open to air. Patient was question to why he removed dressing patient, but didn't offer an explanation. Elevated v/s. Pain meds given for pain. Monitoring ongoing. 0630-MD paged to be made aware. Awaiting return call. Patient is noted to be in no ditress at this time. 0645-Paged return. MD currently in OR. Patient resting quietly. No distress noted at this time.

## 2018-12-22 NOTE — OP NOTES
[facility]  [address] [facility]  Dr. Nicola Nichole, Dr. Adrienne Naik Dr.  [phone] FAX# [phone]    12/21/2018    Hospital: [facility]   Surgeon(s): Steph Capellan MD  Pre-operative Diagnosis: gangrene  left foot  Post-operative Diagnosis: Same  Procedure(s) Performed:  Left  Above knee amputation. Anesthesia:  GETA  Findings: Left foot with extensive tissue loss and gangrene  Complications: None  Estimated Blood Loss:  200mL  Tubes and Drains:  none  Specimens: * No specimens in log *     Procedure in Detail: After informed consent was obtained, the patient was taken to the operating room and placed supine on the table. General endotracheal anesthesia was induced. The  left  Lower extremity was prepped with chlorhexidine and draped in the usual sterile fashion. A skin incision at the left thigh was performed  above the patella. The skin and soft tissue were divided. Hemostasis was achieved with Bovie Cautery. The dissection was continued down until circumferential isolation of the femur was achieved. The femur were divided  10 cm from the knee joint. The femorall bundles were divided then tied with 3-0 silk ties. A posterior flap was developed following the posterior border of the fibula. All bleeding vessels were controlled with Bovie cautery or tied with silk. Flap was irrigated and trimmed to adequately cover tibia without tension. Fascia was approximate with 2-0 vicryl  Suture and skin approximate with staples. The patient tolerated the procedure well without any complications and was sent to the recovery area in stable condition.      Steph Capellan MD     12/21/2018

## 2018-12-22 NOTE — PROGRESS NOTES
Medicine Progress Note    Patient: Heather Mitchell   Age:  74 y.o.  DOA: 12/21/2018   Admit Dx / CC: [id number]  Critical lower limb ischemia  LOS:  LOS: 1 day     Assessment/Plan   Active Problems:    Critical lower limb ischemia (12/21/2018)        Additional Plan notes       L AKA   -defer primary  -pain control  -PT  -ICS      DM II  -accuchecks  -SSI  -diabetic diet      HTN   -monitor BP  -home medications      Hyperlipidemia  -statin      PAD  -restart plavix per surgery       Seizures   -home medications  -seizure precautions  -monitor          DISPO   Anticipated Date of Discharge: per primary  Anticipated Disposition (home, SNF) : home vs snf    Subjective:   Patient seen and examined. No complaints this am, pain tolerable    Objective:     Visit Vitals  /73   Pulse (!) 106   Temp 100.1 °F (37.8 °C)   Resp 19   Ht 5' 4\" (1.626 m)   Wt 67.4 kg (148 lb 8 oz)   SpO2 95%   BMI 25.49 kg/m²       Physical Exam:  General appearance: alert, cooperative, no distress, appears stated age  Head: Normocephalic, without obvious abnormality, atraumatic  Neck: supple, trachea midline  Lungs: clear to auscultation bilaterally  Heart: regular rate and rhythm, S1, S2 normal, no murmur, click, rub or gallop  Abdomen: soft, non-tender. Bowel sounds normal. No masses,  no organomegaly  Extremities: LAKA  Skin: Skin color, texture, turgor normal. No rashes or lesions  Neurologic: Grossly normal    Intake and Output:  Current Shift:  No intake/output data recorded.   Last three shifts:  12/20 1901 - 12/22 0700  In: 780 [P.O.:180; I.V.:600]  Out: 375 [Urine:375]    Lab/Data Reviewed:  CMP:   Lab Results   Component Value Date/Time     (L) 12/22/2018 08:35 AM    K 4.2 12/22/2018 08:35 AM    CL 99 (L) 12/22/2018 08:35 AM    CO2 24 12/22/2018 08:35 AM    AGAP 10 12/22/2018 08:35 AM    GLU 86 12/22/2018 08:35 AM    BUN 15 12/22/2018 08:35 AM    CREA 0.82 12/22/2018 08:35 AM    GFRAA >60 12/22/2018 08:35 AM    GFRNA >60 12/22/2018 08:35 AM    CA 8.1 (L) 12/22/2018 08:35 AM     CBC:   Lab Results   Component Value Date/Time    WBC 17.7 (H) 12/22/2018 08:35 AM    HGB 9.9 (L) 12/22/2018 08:35 AM    HCT 29.4 (L) 12/22/2018 08:35 AM     12/22/2018 08:35 AM     All Cardiac Markers in the last 24 hours: No results found for: CPK, CK, CKMMB, CKMB, RCK3, CKMBT, CKNDX, CKND1, PHILL, TROPT, TROIQ, KIRSTEN, TROPT, TNIPOC, BNP, BNPP    Medications Reviewed:  Current Facility-Administered Medications   Medication Dose Route Frequency    0.9% sodium chloride infusion  75 mL/hr IntraVENous CONTINUOUS    atorvastatin (LIPITOR) tablet 20 mg  20 mg Oral QHS    amLODIPine (NORVASC) tablet 10 mg  10 mg Oral DAILY    allopurinol (ZYLOPRIM) tablet 100 mg  100 mg Oral DAILY    carvedilol (COREG) tablet 25 mg  25 mg Oral BID WITH MEALS    calcium carbonate (TUMS) chewable tablet 200 mg [elemental]  200 mg Oral TID PRN    docusate sodium (COLACE) capsule 100 mg  100 mg Oral DAILY    ferrous sulfate tablet 325 mg  1 Tab Oral DAILY WITH BREAKFAST    furosemide (LASIX) tablet 20 mg  20 mg Oral DAILY    guaiFENesin ER (MUCINEX) tablet 600 mg  600 mg Oral Q12H    hydrALAZINE (APRESOLINE) tablet 25 mg  25 mg Oral TID    gabapentin (NEURONTIN) capsule 400 mg  400 mg Oral TID    levETIRAcetam (KEPPRA) tablet 750 mg  750 mg Oral BID    memantine (NAMENDA) tablet 10 mg  10 mg Oral DAILY    tamsulosin (FLOMAX) capsule 0.4 mg  0.4 mg Oral DAILY    phenytoin ER (DILANTIN ER) ER capsule 100 mg  100 mg Oral TID    insulin lispro (HUMALOG) injection   SubCUTAneous AC&HS    albuterol-ipratropium (DUO-NEB) 2.5 MG-0.5 MG/3 ML  3 mL Nebulization Q6H PRN    oxyCODONE-acetaminophen (PERCOCET) 5-325 mg per tablet 1 Tab  1 Tab Oral Q4H PRN    oxyCODONE-acetaminophen (PERCOCET) 5-325 mg per tablet 2 Tab  2 Tab Oral Q4H PRN       Christine Osorio MD    December 22, 2018

## 2018-12-23 LAB
GLUCOSE BLD STRIP.AUTO-MCNC: 123 MG/DL (ref 70–110)
GLUCOSE BLD STRIP.AUTO-MCNC: 126 MG/DL (ref 70–110)
GLUCOSE BLD STRIP.AUTO-MCNC: 127 MG/DL (ref 70–110)
GLUCOSE BLD STRIP.AUTO-MCNC: 142 MG/DL (ref 70–110)

## 2018-12-23 PROCEDURE — 65270000029 HC RM PRIVATE

## 2018-12-23 PROCEDURE — 77030011943

## 2018-12-23 PROCEDURE — 74011250636 HC RX REV CODE- 250/636: Performed by: HOSPITALIST

## 2018-12-23 PROCEDURE — 51798 US URINE CAPACITY MEASURE: CPT

## 2018-12-23 PROCEDURE — 74011250637 HC RX REV CODE- 250/637: Performed by: SURGERY

## 2018-12-23 PROCEDURE — 82962 GLUCOSE BLOOD TEST: CPT

## 2018-12-23 PROCEDURE — 77030034849

## 2018-12-23 RX ADMIN — GUAIFENESIN 600 MG: 600 TABLET, EXTENDED RELEASE ORAL at 21:35

## 2018-12-23 RX ADMIN — GUAIFENESIN 600 MG: 600 TABLET, EXTENDED RELEASE ORAL at 08:32

## 2018-12-23 RX ADMIN — MEMANTINE 10 MG: 5 TABLET ORAL at 08:32

## 2018-12-23 RX ADMIN — GABAPENTIN 400 MG: 400 CAPSULE ORAL at 15:39

## 2018-12-23 RX ADMIN — PHENYTOIN SODIUM 100 MG: 100 CAPSULE ORAL at 15:39

## 2018-12-23 RX ADMIN — GABAPENTIN 400 MG: 400 CAPSULE ORAL at 08:33

## 2018-12-23 RX ADMIN — FERROUS SULFATE TAB 325 MG (65 MG ELEMENTAL FE) 325 MG: 325 (65 FE) TAB at 08:33

## 2018-12-23 RX ADMIN — LEVETIRACETAM 750 MG: 500 TABLET ORAL at 17:30

## 2018-12-23 RX ADMIN — CARVEDILOL 25 MG: 25 TABLET, FILM COATED ORAL at 17:30

## 2018-12-23 RX ADMIN — HYDRALAZINE HYDROCHLORIDE 25 MG: 25 TABLET ORAL at 21:35

## 2018-12-23 RX ADMIN — OXYCODONE AND ACETAMINOPHEN 2 TABLET: 5; 325 TABLET ORAL at 11:52

## 2018-12-23 RX ADMIN — DOCUSATE SODIUM 100 MG: 100 CAPSULE, LIQUID FILLED ORAL at 08:34

## 2018-12-23 RX ADMIN — ATORVASTATIN CALCIUM 20 MG: 20 TABLET, FILM COATED ORAL at 21:35

## 2018-12-23 RX ADMIN — CARVEDILOL 25 MG: 25 TABLET, FILM COATED ORAL at 08:36

## 2018-12-23 RX ADMIN — HYDRALAZINE HYDROCHLORIDE 25 MG: 25 TABLET ORAL at 15:39

## 2018-12-23 RX ADMIN — AMLODIPINE BESYLATE 10 MG: 10 TABLET ORAL at 08:36

## 2018-12-23 RX ADMIN — TAMSULOSIN HYDROCHLORIDE 0.4 MG: 0.4 CAPSULE ORAL at 08:35

## 2018-12-23 RX ADMIN — FUROSEMIDE 20 MG: 20 TABLET ORAL at 08:36

## 2018-12-23 RX ADMIN — GABAPENTIN 400 MG: 400 CAPSULE ORAL at 21:35

## 2018-12-23 RX ADMIN — ALLOPURINOL 100 MG: 100 TABLET ORAL at 08:36

## 2018-12-23 RX ADMIN — LEVETIRACETAM 750 MG: 500 TABLET ORAL at 08:35

## 2018-12-23 RX ADMIN — PHENYTOIN SODIUM 100 MG: 100 CAPSULE ORAL at 21:35

## 2018-12-23 RX ADMIN — PHENYTOIN SODIUM 100 MG: 100 CAPSULE ORAL at 08:32

## 2018-12-23 RX ADMIN — HYDRALAZINE HYDROCHLORIDE 25 MG: 25 TABLET ORAL at 08:36

## 2018-12-23 RX ADMIN — OXYCODONE AND ACETAMINOPHEN 2 TABLET: 5; 325 TABLET ORAL at 18:37

## 2018-12-23 RX ADMIN — OXYCODONE AND ACETAMINOPHEN 2 TABLET: 5; 325 TABLET ORAL at 22:06

## 2018-12-23 RX ADMIN — OXYCODONE AND ACETAMINOPHEN 1 TABLET: 5; 325 TABLET ORAL at 02:18

## 2018-12-23 RX ADMIN — SODIUM CHLORIDE 75 ML/HR: 900 INJECTION, SOLUTION INTRAVENOUS at 15:39

## 2018-12-23 NOTE — PROGRESS NOTES
Spoke with Mr Nichelle Vu (brother in law) who wanted to be updated on patient's care. Mr Nichelle Vu was spoken to in length, was update and made aware of pt's condition. Mr Nichelle Vu was able provided name of nursing rehab center info.     [facility] [address], [facility]

## 2018-12-23 NOTE — PROGRESS NOTES
Rounds done and no urine output since straight cath was done. Patient denies discomfort or the urge to urinate. MD paged and received return call. Made aware. No new orders noted. Monitoring is ongoing.

## 2018-12-23 NOTE — PROGRESS NOTES
Patient complains of not being to urinate. Noted urine bag empty. Patient was bladder scan, recorded >1037ml. MD paged. Awaiting return call. Paged return, ok to straight cath. 1400ml of clear yellow urine recorded as output. Patient stated \"feel so much better. \"    Pain meds given for discomfort.

## 2018-12-23 NOTE — PROGRESS NOTES
Medicine Progress Note    Patient: Reyes Seo   Age:  74 y.o.  DOA: 12/21/2018   Admit Dx / CC: [id number]  Critical lower limb ischemia  LOS:  LOS: 2 days     Assessment/Plan   Principal Problem:    Critical lower limb ischemia (12/21/2018)    Active Problems:    PAD (peripheral artery disease) ([facility] (9/17/2012)      DM (diabetes mellitus) ([facility] (12/22/2018)      HTN (hypertension) (12/22/2018)        Additional Plan notes       L AKA   -defer primary  -pain control  -PT  -ICS      DM II  -accuchecks  -SSI  -diabetic diet      HTN   -monitor BP  -home medications      Hyperlipidemia  -statin      PAD  -restart plavix per surgery       Seizures   -home medications  -seizure precautions  -monitor          DISPO   Anticipated Date of Discharge: per primary  Anticipated Disposition (home, SNF) : home vs snf    Subjective:   Patient seen and examined. Up in a chair this morning, doing well    Objective:     Visit Vitals  /67 (BP 1 Location: Left arm, BP Patient Position: At rest)   Pulse 87   Temp 97.7 °F (36.5 °C)   Resp 18   Ht 5' 4\" (1.626 m)   Wt 67.4 kg (148 lb 8 oz)   SpO2 94%   BMI 25.49 kg/m²       Physical Exam:  General appearance: alert, cooperative, no distress, appears stated age  Head: Normocephalic, without obvious abnormality, atraumatic  Neck: supple, trachea midline  Lungs: clear to auscultation bilaterally  Heart: regular rate and rhythm, S1, S2 normal, no murmur, click, rub or gallop  Abdomen: soft, non-tender. Bowel sounds normal. No masses,  no organomegaly  Extremities: LAKA  Skin: Skin color, texture, turgor normal. No rashes or lesions  Neurologic: Grossly normal    Intake and Output:  Current Shift:  No intake/output data recorded. Last three shifts:  12/21 1901 - 12/23 0700  In: 1716.3 [P.O.:1020;  I.V.:696.3]  Out: 7316 [Urine:2375]    Lab/Data Reviewed:  CMP:   No results found for: NA, K, CL, CO2, AGAP, GLU, BUN, CREA, GFRAA, GFRNA, CA, MG, PHOS, ALB, TBIL, TP, ALB, GLOB, AGRAT, SGOT, ALT, GPT  CBC:   No results found for: WBC, HGB, HGBEXT, HCT, HCTEXT, PLT, PLTEXT, HGBEXT, HCTEXT, PLTEXT  All Cardiac Markers in the last 24 hours: No results found for: CPK, CK, CKMMB, CKMB, RCK3, CKMBT, CKNDX, CKND1, PHILL, TROPT, TROIQ, KIRSTEN, TROPT, TNIPOC, BNP, BNPP    Medications Reviewed:  Current Facility-Administered Medications   Medication Dose Route Frequency    0.9% sodium chloride infusion  75 mL/hr IntraVENous CONTINUOUS    amLODIPine (NORVASC) tablet 10 mg  10 mg Oral DAILY    allopurinol (ZYLOPRIM) tablet 100 mg  100 mg Oral DAILY    carvedilol (COREG) tablet 25 mg  25 mg Oral BID WITH MEALS    calcium carbonate (TUMS) chewable tablet 200 mg [elemental]  200 mg Oral TID PRN    docusate sodium (COLACE) capsule 100 mg  100 mg Oral DAILY    ferrous sulfate tablet 325 mg  1 Tab Oral DAILY WITH BREAKFAST    furosemide (LASIX) tablet 20 mg  20 mg Oral DAILY    guaiFENesin ER (MUCINEX) tablet 600 mg  600 mg Oral Q12H    hydrALAZINE (APRESOLINE) tablet 25 mg  25 mg Oral TID    gabapentin (NEURONTIN) capsule 400 mg  400 mg Oral TID    levETIRAcetam (KEPPRA) tablet 750 mg  750 mg Oral BID    memantine (NAMENDA) tablet 10 mg  10 mg Oral DAILY    tamsulosin (FLOMAX) capsule 0.4 mg  0.4 mg Oral DAILY    phenytoin ER (DILANTIN ER) ER capsule 100 mg  100 mg Oral TID    atorvastatin (LIPITOR) tablet 20 mg  20 mg Oral QHS    insulin lispro (HUMALOG) injection   SubCUTAneous AC&HS    albuterol-ipratropium (DUO-NEB) 2.5 MG-0.5 MG/3 ML  3 mL Nebulization Q6H PRN    oxyCODONE-acetaminophen (PERCOCET) 5-325 mg per tablet 1 Tab  1 Tab Oral Q4H PRN    oxyCODONE-acetaminophen (PERCOCET) 5-325 mg per tablet 2 Tab  2 Tab Oral Q4H PRN       Rafat Herrera MD    December 23, 2018

## 2018-12-23 NOTE — PROGRESS NOTES
Problem: Falls - Risk of  Goal: *Absence of Falls  Document Elza Fall Risk and appropriate interventions in the flowsheet.   Outcome: Progressing Towards Goal  Fall Risk Interventions:  Mobility Interventions: Patient to call before getting OOB, Communicate number of staff needed for ambulation/transfer    Mentation Interventions: Adequate sleep, hydration, pain control, More frequent rounding    Medication Interventions: Evaluate medications/consider consulting pharmacy, Patient to call before getting OOB    Elimination Interventions: Call light in reach, Patient to call for help with toileting needs    History of Falls Interventions: Evaluate medications/consider consulting pharmacy

## 2018-12-23 NOTE — PROGRESS NOTES
Bedside and Verbal shift change report given to Petty Paris RN (oncoming nurse) by Allen Haro RN (offgoing nurse). Report included the following information SBAR, Kardex, Intake/Output and MAR.     1000- Complete assist to bedside commode, pt had a small bm. Tolerated well. Bed bath and shaved given. Sitting up in bedside recliner with family present. No complaint of pain or discomfort. 1152-Complaint of pain 8 of 10 to left leg, prn medication given. Will continue to monitor. 1220-Pain reassessment 5 of 10 to left leg, repositioned on pillow, tolerated well. 36- Notified Dr. Yasmine Waller of patient not voiding today. Notified her that last night he had to be straight cath for urinary retention. Verbal order to place bland cath for a day or two.     1315- 16 fr Bland catheter placed, pt tolerated well, draining clear tejal colored urine. Will continue to monitor. 1500- Assisted pt to bedside commode x2 assist, pt unable to bare weight on RLE for long periods of time. Bedside and Verbal shift change report given to Christopher Keller (oncoming nurse) by Nga Rocha (offgoing nurse). Report included the following information SBAR, Kardex, Intake/Output and MAR.

## 2018-12-23 NOTE — PROGRESS NOTES
[facility]  [address] [facility]  Dr. Royal Canela, Dr. Joshua Marie , Dr. Heidi Fuchs  [phone] FAX# [phone]  PROGRESS NOTE    Patient: Ayaan Silver MRN: [id number]  SSN: [id number]    YOB: 1945  Age: 74 y.o. Sex: male      Date: 12/23/2018    Hospital: [address] [place] [address] Day: 2    Plan:   increase activity and out of bed - dressing removed. Discharge planning    Assessment:   good progress, wounds fine    Subjective:   No complaints  Not required    Objective:   Admit weight: Weight: 67.4 kg (148 lb 8 oz)  Last recorded weight: Weight: 67.4 kg (148 lb 8 oz)    Visit Vitals  /67 (BP 1 Location: Left arm, BP Patient Position: At rest)   Pulse 87   Temp 97.7 °F (36.5 °C)   Resp 18   Ht 5' 4\" (1.626 m)   Wt 67.4 kg (148 lb 8 oz)   SpO2 94%   BMI 25.49 kg/m²       Intake/Output Summary (Last 24 hours) at 12/23/2018 1442  Last data filed at 12/23/2018 0400  Gross per 24 hour   Intake 1176.25 ml   Output 1700 ml   Net -523.75 ml       Physical exam was negative except for: Left AKA incision clean and dry with staples in place. Labs:     Recent Labs     12/22/18  0835 12/21/18  1030   WBC 17.7* 11.5   HGB 9.9* 10.9*   HCT 29.4* 31.2*    297   RDW 13.0 12.9     Recent Labs     12/22/18  0835 12/21/18  1030   * 127*   K 4.2 4.3   CL 99* 94*   CO2 24 22   GLU 86 97   BUN 15 28*   CREA 0.82 1.43*   CA 8.1* 8.9     No results for input(s): PH, PCO2, PO2, HCO3, FIO2 in the last 72 hours.       Huong James MD, FACS

## 2018-12-23 NOTE — PROGRESS NOTES
Problem: Falls - Risk of  Goal: *Absence of Falls  Document Elza Fall Risk and appropriate interventions in the flowsheet. Outcome: Progressing Towards Goal  Fall Risk Interventions:  Mobility Interventions: Patient to call before getting OOB, Communicate number of staff needed for ambulation/transfer    Mentation Interventions: Adequate sleep, hydration, pain control, More frequent rounding, Door open when patient unattended    Medication Interventions: Assess postural VS orthostatic hypotension, Patient to call before getting OOB, Evaluate medications/consider consulting pharmacy    Elimination Interventions: Call light in reach, Patient to call for help with toileting needs, Toilet paper/wipes in reach, Toileting schedule/hourly rounds    History of Falls Interventions: Evaluate medications/consider consulting pharmacy, Room close to nurse's station        Problem: Pressure Injury - Risk of  Goal: *Prevention of pressure injury  Document Lior Scale and appropriate interventions in the flowsheet.   Outcome: Progressing Towards Goal  Pressure Injury Interventions:       Moisture Interventions: Absorbent underpads    Activity Interventions: Increase time out of bed, Pressure redistribution bed/mattress(bed type)    Mobility Interventions: Pressure redistribution bed/mattress (bed type), HOB 30 degrees or less    Nutrition Interventions: Document food/fluid/supplement intake    Friction and Shear Interventions: Apply protective barrier, creams and emollients, HOB 30 degrees or less

## 2018-12-23 NOTE — PROGRESS NOTES
1920-Bedside and Verbal shift change report given to David Witt (oncoming nurse) by Sergo Bahena (offgoing nurse). Report included the following information SBAR, Kardex and MAR.

## 2018-12-23 NOTE — PROGRESS NOTES
Patient complains of pain in left leg, pain meds given as ordered. Patient is alert and verbal. Tolerated all meds well. Patient complain of right heel pain. Sponge dressing applied and heel floated with pillows. Patient is able to make needs know. Monitoring is ongoing.

## 2018-12-24 LAB
ANION GAP SERPL CALC-SCNC: 7 MMOL/L (ref 3–18)
BUN SERPL-MCNC: 14 MG/DL (ref 7–18)
BUN/CREAT SERPL: 18 (ref 12–20)
CALCIUM SERPL-MCNC: 7.7 MG/DL (ref 8.5–10.1)
CHLORIDE SERPL-SCNC: 101 MMOL/L (ref 100–108)
CO2 SERPL-SCNC: 24 MMOL/L (ref 21–32)
CREAT SERPL-MCNC: 0.77 MG/DL (ref 0.6–1.3)
ERYTHROCYTE [DISTWIDTH] IN BLOOD BY AUTOMATED COUNT: 13.4 % (ref 11.6–14.5)
GLUCOSE BLD STRIP.AUTO-MCNC: 107 MG/DL (ref 70–110)
GLUCOSE BLD STRIP.AUTO-MCNC: 114 MG/DL (ref 70–110)
GLUCOSE BLD STRIP.AUTO-MCNC: 118 MG/DL (ref 70–110)
GLUCOSE BLD STRIP.AUTO-MCNC: 125 MG/DL (ref 70–110)
GLUCOSE SERPL-MCNC: 101 MG/DL (ref 74–99)
HCT VFR BLD AUTO: 25.5 % (ref 36–48)
HGB BLD-MCNC: 8.6 G/DL (ref 13–16)
MCH RBC QN AUTO: 31.2 PG (ref 24–34)
MCHC RBC AUTO-ENTMCNC: 33.7 G/DL (ref 31–37)
MCV RBC AUTO: 92.4 FL (ref 74–97)
PLATELET # BLD AUTO: 258 K/UL (ref 135–420)
PMV BLD AUTO: 9.2 FL (ref 9.2–11.8)
POTASSIUM SERPL-SCNC: 3.9 MMOL/L (ref 3.5–5.5)
RBC # BLD AUTO: 2.76 M/UL (ref 4.7–5.5)
SODIUM SERPL-SCNC: 132 MMOL/L (ref 136–145)
WBC # BLD AUTO: 10.8 K/UL (ref 4.6–13.2)

## 2018-12-24 PROCEDURE — 74011250637 HC RX REV CODE- 250/637: Performed by: SURGERY

## 2018-12-24 PROCEDURE — 97162 PT EVAL MOD COMPLEX 30 MIN: CPT

## 2018-12-24 PROCEDURE — 82962 GLUCOSE BLOOD TEST: CPT

## 2018-12-24 PROCEDURE — 74011250637 HC RX REV CODE- 250/637: Performed by: HOSPITALIST

## 2018-12-24 PROCEDURE — 65270000029 HC RM PRIVATE

## 2018-12-24 PROCEDURE — 80048 BASIC METABOLIC PNL TOTAL CA: CPT

## 2018-12-24 PROCEDURE — 36415 COLL VENOUS BLD VENIPUNCTURE: CPT

## 2018-12-24 PROCEDURE — 97530 THERAPEUTIC ACTIVITIES: CPT

## 2018-12-24 PROCEDURE — 85027 COMPLETE CBC AUTOMATED: CPT

## 2018-12-24 RX ORDER — CLOPIDOGREL BISULFATE 75 MG/1
75 TABLET ORAL DAILY
Status: DISCONTINUED | OUTPATIENT
Start: 2018-12-24 | End: 2018-12-25 | Stop reason: HOSPADM

## 2018-12-24 RX ADMIN — OXYCODONE AND ACETAMINOPHEN 2 TABLET: 5; 325 TABLET ORAL at 10:01

## 2018-12-24 RX ADMIN — ATORVASTATIN CALCIUM 20 MG: 20 TABLET, FILM COATED ORAL at 21:56

## 2018-12-24 RX ADMIN — GABAPENTIN 400 MG: 400 CAPSULE ORAL at 09:56

## 2018-12-24 RX ADMIN — GUAIFENESIN 600 MG: 600 TABLET, EXTENDED RELEASE ORAL at 20:39

## 2018-12-24 RX ADMIN — CARVEDILOL 25 MG: 25 TABLET, FILM COATED ORAL at 16:24

## 2018-12-24 RX ADMIN — GABAPENTIN 400 MG: 400 CAPSULE ORAL at 20:38

## 2018-12-24 RX ADMIN — TAMSULOSIN HYDROCHLORIDE 0.4 MG: 0.4 CAPSULE ORAL at 09:56

## 2018-12-24 RX ADMIN — PHENYTOIN SODIUM 100 MG: 100 CAPSULE ORAL at 20:38

## 2018-12-24 RX ADMIN — HYDRALAZINE HYDROCHLORIDE 25 MG: 25 TABLET ORAL at 09:56

## 2018-12-24 RX ADMIN — CLOPIDOGREL BISULFATE 75 MG: 75 TABLET ORAL at 12:32

## 2018-12-24 RX ADMIN — ALLOPURINOL 100 MG: 100 TABLET ORAL at 09:56

## 2018-12-24 RX ADMIN — GUAIFENESIN 600 MG: 600 TABLET, EXTENDED RELEASE ORAL at 09:09

## 2018-12-24 RX ADMIN — DOCUSATE SODIUM 100 MG: 100 CAPSULE, LIQUID FILLED ORAL at 09:56

## 2018-12-24 RX ADMIN — OXYCODONE AND ACETAMINOPHEN 2 TABLET: 5; 325 TABLET ORAL at 20:38

## 2018-12-24 RX ADMIN — HYDRALAZINE HYDROCHLORIDE 25 MG: 25 TABLET ORAL at 16:00

## 2018-12-24 RX ADMIN — CARVEDILOL 25 MG: 25 TABLET, FILM COATED ORAL at 10:01

## 2018-12-24 RX ADMIN — PHENYTOIN SODIUM 100 MG: 100 CAPSULE ORAL at 15:00

## 2018-12-24 RX ADMIN — FUROSEMIDE 20 MG: 20 TABLET ORAL at 09:56

## 2018-12-24 RX ADMIN — PHENYTOIN SODIUM 100 MG: 100 CAPSULE ORAL at 09:56

## 2018-12-24 RX ADMIN — GABAPENTIN 400 MG: 400 CAPSULE ORAL at 15:31

## 2018-12-24 RX ADMIN — LEVETIRACETAM 750 MG: 500 TABLET ORAL at 19:04

## 2018-12-24 RX ADMIN — MEMANTINE 10 MG: 5 TABLET ORAL at 09:56

## 2018-12-24 RX ADMIN — LEVETIRACETAM 750 MG: 500 TABLET ORAL at 10:00

## 2018-12-24 RX ADMIN — FERROUS SULFATE TAB 325 MG (65 MG ELEMENTAL FE) 325 MG: 325 (65 FE) TAB at 09:56

## 2018-12-24 RX ADMIN — AMLODIPINE BESYLATE 10 MG: 10 TABLET ORAL at 09:56

## 2018-12-24 RX ADMIN — OXYCODONE AND ACETAMINOPHEN 2 TABLET: 5; 325 TABLET ORAL at 15:38

## 2018-12-24 RX ADMIN — HYDRALAZINE HYDROCHLORIDE 25 MG: 25 TABLET ORAL at 21:56

## 2018-12-24 NOTE — PROGRESS NOTES
Paged Dr. Amisha Hanson answering service re: can pt be discharged today. Spoke with [facility]. They can accept pt back to room 310B    Spoke with Dr. Farhan Kapoor who stated that she is not in the hospital today and would not be able to write prescriptions for pt transfer to facility, but can discharge in am if facility able to accept. Spoke with Joo Copeland at [facility], who stated that there is no problem with accepting pt tomorrow ([place]). Provided his cell number in case any issues ([phone]). Call report to [phone], unit 2. Informed Dr. Farhan Kapoor. PCS, envelope completed, along with face sheet, and placed at nurses station.

## 2018-12-24 NOTE — PROGRESS NOTES
Internal Medicine Progress Note    Patient's Name: Emerita Mcgee  Admit Date: 12/21/2018  Length of Stay: 3      Assessment/Plan     Active Hospital Problems    Diagnosis Date Noted    DM (diabetes mellitus) ([facility] 12/22/2018    HTN (hypertension) 12/22/2018    Critical lower limb ischemia 12/21/2018    PAD (peripheral artery disease) ([facility] 09/17/2012     - Diet and mobilization per primary team  - Pain control PRN  - PT/OT  - BP overall in good range  - Restart plavix  - Cont ASA  - Ok to stop IVFs  - OK for d/c from medicine standpoint  - Cont acceptable home medications for chronic conditions   - DVT protocol    I have personally reviewed all pertinent labs and films that have officially resulted over the last 24 hours. I have personally checked for all pending labs that are awaiting final results.     Subjective     Pt s/e @ bedside  No major events overnight  Doing well and without complaints  Denies CP or SOB    Objective     Visit Vitals  /63 (BP 1 Location: Left arm, BP Patient Position: At rest)   Pulse 97   Temp 99.9 °F (37.7 °C)   Resp 20   Ht 5' 4\" (1.626 m)   Wt 67.4 kg (148 lb 8 oz)   SpO2 94%   BMI 25.49 kg/m²       Physical Exam:  General Appearance: NAD, conversant  Lungs: CTA with normal respiratory effort  CV: RRR, no m/r/g  Abdomen: soft, non-tender, normal bowel sounds  Extremities: no cyanosis, L BKA wound C/D/I  Neuro: No focal deficits, motor/sensory intact    Lab/Data Reviewed:  BMP:   Lab Results   Component Value Date/Time     (L) 12/24/2018 05:20 AM    K 3.9 12/24/2018 05:20 AM     12/24/2018 05:20 AM    CO2 24 12/24/2018 05:20 AM    AGAP 7 12/24/2018 05:20 AM     (H) 12/24/2018 05:20 AM    BUN 14 12/24/2018 05:20 AM    CREA 0.77 12/24/2018 05:20 AM    GFRAA >60 12/24/2018 05:20 AM    GFRNA >60 12/24/2018 05:20 AM     CBC:   Lab Results   Component Value Date/Time    WBC 10.8 12/24/2018 05:20 AM    HGB 8.6 (L) 12/24/2018 05:20 AM    HCT 25.5 (L) 12/24/2018 05:20 AM     12/24/2018 05:20 AM       Imaging Reviewed:  No results found.     Medications Reviewed:  Current Facility-Administered Medications   Medication Dose Route Frequency    0.9% sodium chloride infusion  75 mL/hr IntraVENous CONTINUOUS    amLODIPine (NORVASC) tablet 10 mg  10 mg Oral DAILY    allopurinol (ZYLOPRIM) tablet 100 mg  100 mg Oral DAILY    carvedilol (COREG) tablet 25 mg  25 mg Oral BID WITH MEALS    calcium carbonate (TUMS) chewable tablet 200 mg [elemental]  200 mg Oral TID PRN    docusate sodium (COLACE) capsule 100 mg  100 mg Oral DAILY    ferrous sulfate tablet 325 mg  1 Tab Oral DAILY WITH BREAKFAST    furosemide (LASIX) tablet 20 mg  20 mg Oral DAILY    guaiFENesin ER (MUCINEX) tablet 600 mg  600 mg Oral Q12H    hydrALAZINE (APRESOLINE) tablet 25 mg  25 mg Oral TID    gabapentin (NEURONTIN) capsule 400 mg  400 mg Oral TID    levETIRAcetam (KEPPRA) tablet 750 mg  750 mg Oral BID    memantine (NAMENDA) tablet 10 mg  10 mg Oral DAILY    tamsulosin (FLOMAX) capsule 0.4 mg  0.4 mg Oral DAILY    phenytoin ER (DILANTIN ER) ER capsule 100 mg  100 mg Oral TID    atorvastatin (LIPITOR) tablet 20 mg  20 mg Oral QHS    insulin lispro (HUMALOG) injection   SubCUTAneous AC&HS    albuterol-ipratropium (DUO-NEB) 2.5 MG-0.5 MG/3 ML  3 mL Nebulization Q6H PRN    oxyCODONE-acetaminophen (PERCOCET) 5-325 mg per tablet 1 Tab  1 Tab Oral Q4H PRN    oxyCODONE-acetaminophen (PERCOCET) 5-325 mg per tablet 2 Tab  2 Tab Oral Q4H PRN           [place] DO Nancy  Internal Medicine, Hospitalist  Pager: [phone]  [facility]

## 2018-12-24 NOTE — CDMP QUERY
The medical record reflects the following:    Risk: 74 yom admitted 12/21 for sx     Clinical Indicators: 12/21 Sodium level prior to surgery was 127.  12/22- Sodium 133  12/24- Sodium 132    Treatment: Recieved IVF in OR, IVF of NS from 12/22-12/24    Please clarify if this patient is being treated/managed for:    =>Mild hyponatremia, resolving with IVF  =>Other Explanation of clinical findings  =>Unable to Determine (no explanation of clinical findings)    Please clarify and document your clinical opinion in the progress notes and discharge summary.     Thank you,  [facility] [address] [place] [address]

## 2018-12-24 NOTE — PROGRESS NOTES
1000  Alert and oriented, left BKA, staples intact, incision looks clean, elevated on a folded blanket. 1200  Pain under control at this time, awaiting  P. T    1500  No  Pain, moving on the bed.    1900  Pain under control.

## 2018-12-24 NOTE — PROGRESS NOTES
[facility]  [address] [facility]  Dr. Maday Lacy, Dr. Juan Daniel Mcnally , Dr. Nelson Johnson  [phone] FAX# [phone]  PROGRESS NOTE    Patient: Shannon Meek MRN: [id number]  SSN: [id number]    YOB: 1945  Age: 74 y.o. Sex: male      Date: 12/24/2018    Hospital: [address] [place] [address] Day: 2    Plan:   increase activity and out of bed  PT/OT  Discharge planning to facility for today-tomorrow. Assessment:   good progress, wounds fine    Subjective:   No complaints  Not required    Objective:   Admit weight: Weight: 67.4 kg (148 lb 8 oz)  Last recorded weight: Weight: 67.4 kg (148 lb 8 oz)    Visit Vitals  /63 (BP 1 Location: Left arm, BP Patient Position: At rest)   Pulse 97   Temp 99.9 °F (37.7 °C)   Resp 20   Ht 5' 4\" (1.626 m)   Wt 67.4 kg (148 lb 8 oz)   SpO2 94%   BMI 25.49 kg/m²       Intake/Output Summary (Last 24 hours) at 12/24/2018 0912  Last data filed at 12/24/2018 0659  Gross per 24 hour   Intake 1800 ml   Output 1900 ml   Net -100 ml       Physical exam was negative except for: Left AKA incision clean and dry with staples in place. Labs:     Recent Labs     12/24/18  0520 12/22/18  0835 12/21/18  1030   WBC 10.8 17.7* 11.5   HGB 8.6* 9.9* 10.9*   HCT 25.5* 29.4* 31.2*    278 297   RDW 13.4 13.0 12.9     Recent Labs     12/24/18  0520 12/22/18  0835 12/21/18  1030   * 133* 127*   K 3.9 4.2 4.3    99* 94*   CO2 24 24 22   * 86 97   BUN 14 15 28*   CREA 0.77 0.82 1.43*   CA 7.7* 8.1* 8.9     No results for input(s): PH, PCO2, PO2, HCO3, FIO2 in the last 72 hours.       Shaheen Johnson MD, FACS

## 2018-12-24 NOTE — PROGRESS NOTES
Problem: Mobility Impaired (Adult and Pediatric)  Goal: *Acute Goals and Plan of Care (Insert Text)  Physical Therapy Goals   Initiated 12/24/2018 and to be accomplished within 7 days. 1.  Patient will complete all bed mobility with minimal assistance/contact guard assist in order to prepare for EOB/OOB activity. 2.  Patient will perform sit <> stand with minimal assistance/contact guard assist in order to prepare for OOB/gait activity. 3.  Patient will perform bed to chair transfers with moderate assistance  in order to promote mobility and encourage seated activity to progress towards their prior level of function. 6.  Patient will participate in 10 minutes of B LE exercise/AROM with modified independence. Outcome: Progressing Towards Goal  PHYSICAL THERAPY: Initial Assessment   INPATIENT: Medicare: Hospital Day: 4     Patient: Corrinne Riding (58 y.o. male)    Date: 12/24/2018  Primary Diagnosis: i73.9  Critical lower limb ischemia   Procedure(s) (LRB):  left  above the knee amputation (Left), 3 Days Post-Op   Precautions:      PLOF: W/c for mobiltiy    ASSESSMENT :  Patient supine in bed, agreeable to participation with PT. Reports that he lives with family; however, per chart review patient resides at [facility] and rehab. A + O x 2. Oriented to self and location. MAX A x 1 for supine > sit with poor seated balance; verbal cuing for hand placement to assist with balance; MOD A x 1 to maintain seated balance and recover from posterior LOB. MAX x 1 for sit <> stand x 3 trials. Patient demo's improved speed with each trial. Fair standing balance requiring MIN A x 1 for occasional posterior lean. Patient returned to bed and left supine with all needs within reach. No c/o pain. Recommend d/c to SNF.       Patient presents with deficits in:  Bed Mobility, Transfers, Gait, Strength and Home Exercise Program    Patient will benefit from skilled intervention to address the above impairments. Patients rehabilitation potential is considered to be Good  Factors which may influence rehabilitation potential include:   []         None noted  []         Mental ability/status  [x]         Medical condition  []         Home/family situation and support systems  []         Safety awareness  []         Pain tolerance/management  []         Other:      PLAN :  Recommendations and Planned Interventions:  [x]           Bed Mobility Training             [x]    Neuromuscular Re-Education  [x]           Transfer Training                   []    Orthotic/Prosthetic Training  [x]           Gait Training                          []    Modalities  [x]           Therapeutic Exercises          []    Edema Management/Control  [x]           Therapeutic Activities            [x]    Patient and Family Training/Education  []           Other (comment):      EDUCATION:   Education:  Patient was educated on the following topics: Purpose of PT, PT POC, bed mobility, transfers, balance. Verbalizes understanding. Barriers to Learning/Limitations: None  Compensate with: visual, verbal, tactile, kinesthetic cues/model    Recommendations for the next treatment session: bed > chair, transfers  Frequency/Duration: Patient will be followed by physical therapy 3 - 5  times a week to address goals. Discharge Recommendations: Brennan Weems  Further Equipment Recommendations for Discharge: rolling walker, wheelchair (patient has)  Factors which may impact discharge planning: N/A     SUBJECTIVE:   Patient stated I miss my leg.     OBJECTIVE DATA SUMMARY:     Past Medical History:   Diagnosis Date    Anemia     Arthropathy     BPH (benign prostatic hypertrophy)     Diabetes mellitus (HCC)     Difficulty urinating     DVT (deep venous thrombosis) (HCC)     Elevated cholesterol     Esophageal reflux     GERD (gastroesophageal reflux disease)     Gout     Heartburn     Hypercholesteremia     Hyperlipidemia  Hypertension     Hypertrophy of prostate with urinary obstruction and other lower urinary tract symptoms (LUTS)     Hyponatremia     Loose, teeth     Nocturia     PAD (peripheral artery disease) (HCC)     Peptic ulcer of stomach     Pneumonia     Prostate disease     Seizures ([address]     Unspecified epilepsy without mention of intractable epilepsy      Past Surgical History:   Procedure Laterality Date    HX CATARACT REMOVAL      HX COLONOSCOPY      HX CYST REMOVAL      HX OTHER SURGICAL  04/11/2008 - Dr. Rogelio Montana    lower extremity bypass surgery    HX SMALL BOWEL RESECTION           Eval Complexity: History: HIGH Complexity :3+ comorbidities / personal factors will impact the outcome/ POC Exam:MEDIUM Complexity : 3 Standardized tests and measures addressing body structure, function, activity limitation and / or participation in recreation  Presentation: MEDIUM Complexity : Evolving with changing characteristics  Clinical Decision Making:Medium Complexity MAX A for mobility Overall Complexity:MEDIUM    G CODES:Mobility  Current  CL= 60-79%   Goal  CK= 40-59%.   The severity rating is based on the Other MAX A for mobility, fair - poor balance, decreased LE strenght      Prior Level of Function/Home Situation:   Home Situation  Home Environment: Apartment  One/Two Story Residence: One story  Living Alone: No  Support Systems: Family member(s)  Patient Expects to be Discharged to[de-identified] Rehabilitation facility  Current DME Used/Available at Home: Wheelchair  Critical Behavior:  Psychosocial  Patient Behaviors: Calm  Purposeful Interaction: Yes  Skin Condition/Temp: Warm     Manual Muscle Testing (LE)         R     L    Hip Flexion:   NT  NT  Knee EXT:   3+/5  NT  Knee FLEX:   NT  NT  Ankle DF:   3+/5  NT  _________________________________________________   Tone : Normal  Sensation: NT  Range Of Motion: WFL on R    Functional Mobility:      Functional Status      Indep   (I)   Mod I   Super-vision Min A   Mod A   Max A   Total A   Assist x2 Verbal cues Additional time Not tested   Comments   Rolling []  []  [] []    []    []  []  [] [] [] [x]    Supine to sit []  []  [] []  []  [x]  []  [] [] [] []    Sit to supine []  []  [] []  []  [x]  []  [] [] [] []    Sit to stand []  []  [] []  []  [x]  []  [] [] [] []    Stand to sit []  []  [] []  []  [x]  []  [] [] [] []    Bed to chair transfers []  []  [] []  []  []  []  [] [] [] [x]        Balance    Good   Fair   Poor   Unable   Not tested   Comments   Sitting static []  [x]  [x]  []  [] Initially poor, fair with cuing   Sitting dynamic []  [x]  []  []  []    Standing static []  [x]  []  []  []    Standing dynamic []  [x]  []  []  []        Pain:  None    Vital Signs  Temp: 99.9 °F (37.7 °C)     Pulse (Heart Rate): 97     BP: 174/63     Resp Rate: 20     O2 Sat (%): 94 %    Activity Tolerance:   Fair    Please refer to the flowsheet for vital signs taken during this treatment. After treatment:   []         Patient left in no apparent distress sitting up in chair  [x]         Patient left in no apparent distress in bed  [x]         Call bell left within reach  []         Nursing notified  []         Caregiver present  []         Bed alarm activated    COMMUNICATION/EDUCATION:   [x]         Fall prevention education was provided and the patient/caregiver indicated understanding. []         Patient/family have participated as able in goal setting and plan of care. [x]         Patient/family agree to work toward stated goals and plan of care. []         Patient understands intent and goals of therapy, but is neutral about his/her participation. []         Patient is unable to participate in goal setting and plan of care.     Recommendations for nursing:  Written on communication board: up with assist x 1      Thank you for this referral.  Con Lundborg

## 2018-12-24 NOTE — PROGRESS NOTES
Bedside shift change report given to Naz Sullivan RN (oncoming nurse) by LISA Bello (offgoing nurse). Report included the following information SBAR, Kardex and MAR. Bedside shift change report given to Dariusz Mabry RN (oncoming nurse) by Naz Sullivan RN  (offgoing nurse). Report included the following information SBAR, Kardex and MAR.

## 2018-12-24 NOTE — ANCILLARY DISCHARGE INSTRUCTIONS
Call made to [facility] [phone], spoke with [facility], patient is scheduled to be picked up at 2:15 pm on 12/25/18. Trip # is [id number].

## 2018-12-24 NOTE — PROGRESS NOTES
Problem: Falls - Risk of  Goal: *Absence of Falls  Document Elza Fall Risk and appropriate interventions in the flowsheet. Fall Risk Interventions:  Mobility Interventions: PT Consult for mobility concerns    Mentation Interventions: Adequate sleep, hydration, pain control    Medication Interventions: Utilize gait belt for transfers/ambulation    Elimination Interventions: Call light in reach    History of Falls Interventions: Evaluate medications/consider consulting pharmacy        Problem: Pressure Injury - Risk of  Goal: *Prevention of pressure injury  Document Lior Scale and appropriate interventions in the flowsheet.   Outcome: Progressing Towards Goal  Pressure Injury Interventions:       Moisture Interventions: Absorbent underpads    Activity Interventions: Increase time out of bed, PT/OT evaluation    Mobility Interventions: Pressure redistribution bed/mattress (bed type)    Nutrition Interventions: Document food/fluid/supplement intake    Friction and Shear Interventions: HOB 30 degrees or less

## 2018-12-25 VITALS
BODY MASS INDEX: 25.35 KG/M2 | TEMPERATURE: 98.1 F | OXYGEN SATURATION: 100 % | DIASTOLIC BLOOD PRESSURE: 79 MMHG | SYSTOLIC BLOOD PRESSURE: 150 MMHG | RESPIRATION RATE: 16 BRPM | WEIGHT: 148.5 LBS | HEIGHT: 64 IN | HEART RATE: 84 BPM

## 2018-12-25 LAB
GLUCOSE BLD STRIP.AUTO-MCNC: 100 MG/DL (ref 70–110)
GLUCOSE BLD STRIP.AUTO-MCNC: 109 MG/DL (ref 70–110)

## 2018-12-25 PROCEDURE — 97162 PT EVAL MOD COMPLEX 30 MIN: CPT

## 2018-12-25 PROCEDURE — 74011250637 HC RX REV CODE- 250/637: Performed by: HOSPITALIST

## 2018-12-25 PROCEDURE — 82962 GLUCOSE BLOOD TEST: CPT

## 2018-12-25 PROCEDURE — 51798 US URINE CAPACITY MEASURE: CPT

## 2018-12-25 PROCEDURE — 97530 THERAPEUTIC ACTIVITIES: CPT

## 2018-12-25 PROCEDURE — 74011250637 HC RX REV CODE- 250/637: Performed by: SURGERY

## 2018-12-25 RX ORDER — OXYCODONE AND ACETAMINOPHEN 5; 325 MG/1; MG/1
1 TABLET ORAL
Qty: 30 TAB | Refills: 0 | Status: SHIPPED | OUTPATIENT
Start: 2018-12-25

## 2018-12-25 RX ADMIN — FUROSEMIDE 20 MG: 20 TABLET ORAL at 08:16

## 2018-12-25 RX ADMIN — PHENYTOIN SODIUM 100 MG: 100 CAPSULE ORAL at 08:16

## 2018-12-25 RX ADMIN — CARVEDILOL 25 MG: 25 TABLET, FILM COATED ORAL at 08:14

## 2018-12-25 RX ADMIN — AMLODIPINE BESYLATE 10 MG: 10 TABLET ORAL at 08:15

## 2018-12-25 RX ADMIN — MEMANTINE 10 MG: 5 TABLET ORAL at 08:15

## 2018-12-25 RX ADMIN — OXYCODONE AND ACETAMINOPHEN 2 TABLET: 5; 325 TABLET ORAL at 00:32

## 2018-12-25 RX ADMIN — GABAPENTIN 400 MG: 400 CAPSULE ORAL at 08:14

## 2018-12-25 RX ADMIN — TAMSULOSIN HYDROCHLORIDE 0.4 MG: 0.4 CAPSULE ORAL at 08:14

## 2018-12-25 RX ADMIN — HYDRALAZINE HYDROCHLORIDE 25 MG: 25 TABLET ORAL at 08:15

## 2018-12-25 RX ADMIN — ALLOPURINOL 100 MG: 100 TABLET ORAL at 08:15

## 2018-12-25 RX ADMIN — PHENYTOIN SODIUM 100 MG: 100 CAPSULE ORAL at 14:09

## 2018-12-25 RX ADMIN — LEVETIRACETAM 750 MG: 500 TABLET ORAL at 08:15

## 2018-12-25 RX ADMIN — GUAIFENESIN 600 MG: 600 TABLET, EXTENDED RELEASE ORAL at 08:14

## 2018-12-25 RX ADMIN — DOCUSATE SODIUM 100 MG: 100 CAPSULE, LIQUID FILLED ORAL at 08:14

## 2018-12-25 RX ADMIN — CLOPIDOGREL BISULFATE 75 MG: 75 TABLET ORAL at 08:15

## 2018-12-25 RX ADMIN — OXYCODONE AND ACETAMINOPHEN 2 TABLET: 5; 325 TABLET ORAL at 05:14

## 2018-12-25 RX ADMIN — OXYCODONE AND ACETAMINOPHEN 2 TABLET: 5; 325 TABLET ORAL at 14:09

## 2018-12-25 RX ADMIN — FERROUS SULFATE TAB 325 MG (65 MG ELEMENTAL FE) 325 MG: 325 (65 FE) TAB at 08:15

## 2018-12-25 RX ADMIN — GABAPENTIN 400 MG: 400 CAPSULE ORAL at 14:09

## 2018-12-25 NOTE — ROUTINE PROCESS
Bedside and Verbal shift change report given to Marisol Marcus RN (oncoming nurse) by Jacqueline Canas RN   (offgoing nurse). Report included the following information SBAR, Kardex and MAR.

## 2018-12-25 NOTE — PROGRESS NOTES
Problem: Falls - Risk of  Goal: *Absence of Falls  Document Elza Fall Risk and appropriate interventions in the flowsheet.   Outcome: Progressing Towards Goal  Fall Risk Interventions:  Mobility Interventions: Patient to call before getting OOB    Mentation Interventions: Door open when patient unattended    Medication Interventions: Patient to call before getting OOB    Elimination Interventions: Call light in reach, Patient to call for help with toileting needs    History of Falls Interventions: Door open when patient unattended

## 2018-12-25 NOTE — DISCHARGE SUMMARY
[facility]  [address]. [place], [address]  Dr. Scooter Nunez, Dr. Eric Albrecht , Dr. Cait Sánchez  [phone] FAX# [phone]      Discharge Summary     Patient: Negro Jalloh MRN: [id number]  SSN: [id number]    YOB: 1945  Age: 74 y.o. Sex: male       Admit Date: 12/21/2018    Discharge Date: 12/25/2018      Admission Diagnoses: i73. 9;Critical lower limb ischemia    Discharge Diagnoses:   Problem List as of 12/25/2018 Date Reviewed: 9/8/2016          Codes Class Noted - Resolved    DM (diabetes mellitus) ([facility] ICD-10-CM: E11.9  ICD-9-CM: 250.00  12/22/2018 - Present        HTN (hypertension) ICD-10-CM: I10  ICD-9-CM: 401.9  12/22/2018 - Present        * (Principal) Critical lower limb ischemia ICD-10-CM: I99.8  ICD-9-CM: 459.9  12/21/2018 - Present        PAD (peripheral artery disease) ([facility] ICD-10-CM: I73.9  ICD-9-CM: 443.9  9/17/2012 - Present               Discharge Condition: Good    Hospital Course: Admitted for left leg gangrene. Underwent left AKA. He presented with hyponatremia of 127 treated with IV fluids and resolved. Post-operatively recovered well. Incision clean and dry. Tolerated PO. Pain controlled. On 12/25/18 deemed satisfactory for discharge to rehab. Consults: Internal Medicine      Disposition: REhab    Discharge Medications:   Current Discharge Medication List      START taking these medications    Details   oxyCODONE-acetaminophen (PERCOCET) 5-325 mg per tablet Take 1 Tab by mouth every six (6) hours as needed for Pain. Max Daily Amount: 4 Tabs. Qty: 30 Tab, Refills: 0    Associated Diagnoses: Critical lower limb ischemia         CONTINUE these medications which have NOT CHANGED    Details   allopurinol (ZYLOPRIM) 100 mg tablet Take  by mouth daily. calcium citrate-vitamin d3 (CITRACAL+D) 315-200 mg-unit tab Take 1 Tab by mouth daily (with breakfast).       capsicum oleoresin 0. 025 % topical cream Apply  to affected area three (3) times daily. carvedilol (COREG) 25 mg tablet Take 25 mg by mouth two (2) times daily (with meals). docusate sodium (COLACE) 50 mg capsule Take 50 mg by mouth two (2) times a day. ergocalciferol (VITAMIN D2) 50,000 unit capsule Take 50,000 Units by mouth. umeclidinium (INCRUSE ELLIPTA) 62.5 mcg/actuation inhaler Take 1 Puff by inhalation daily. furosemide (LASIX) 20 mg tablet Take  by mouth daily. memantine (NAMENDA) 10 mg tablet Take  by mouth daily. gabapentin (NEURONTIN) 400 mg capsule Take 400 mg by mouth three (3) times daily. clopidogrel (PLAVIX) 75 mg tab Take  by mouth.      potassium citrate (UROCIT-K10) 10 mEq (1,080 mg) TbER Take  by mouth. tamsulosin (FLOMAX) 0.4 mg capsule Take 1 Cap by mouth daily. Qty: 90 Cap, Refills: 3      colchicine (COLCRYS) 0.6 mg tablet Take 0.6 mg by mouth daily. atorvastatin (LIPITOR) 40 mg tablet Take  by mouth daily. phenytoin ER (DILANTIN) 100 mg ER capsule Take  by mouth. hydrALAZINE (APRESOLINE) 25 mg tablet Take 25 mg by mouth three (3) times daily. ranitidine (ZANTAC) 150 mg tablet Take 150 mg by mouth two (2) times a day. levETIRAcetam (KEPPRA) 500 mg tablet Take  by mouth two (2) times a day. amLODIPine (NORVASC) 5 mg tablet Take 5 mg by mouth daily. ferrous sulfate (FEOSOL) 325 mg (65 mg iron) tablet Take  by mouth Daily (before breakfast).            STOP taking these medications       oxyCODONE-acetaminophen (PERCOCET) 7.5-325 mg per tablet Comments:   Reason for Stopping:               Activity: Activity as tolerated  Diet: Resume previous diet  Wound Care: Keep wound clean and dry    Follow-up Appointments   Procedures    FOLLOW UP VISIT Appointment in: One Month     Standing Status:   Standing     Number of Occurrences:   1     Order Specific Question:   Appointment in     Answer:   One Month       Signed By: Rebecca Quinteros MD     December 25, 2018

## 2018-12-25 NOTE — PROGRESS NOTES
2000 Patient received in bed without any complaints voiced. Patient alert & oriented x4. Call light in reach & side rails up x3. Left leg elevated on pillow.

## 2018-12-25 NOTE — PROGRESS NOTES
Internal Medicine Progress Note    Patient's Name: Heather Mitchell  Admit Date: 12/21/2018  Length of Stay: 4      Assessment/Plan     Active Hospital Problems    Diagnosis Date Noted    DM (diabetes mellitus) ([facility] 12/22/2018    HTN (hypertension) 12/22/2018    Critical lower limb ischemia 12/21/2018    PAD (peripheral artery disease) ([facility] 09/17/2012     - Diet and mobilization per primary team  - Pain control PRN  - PT/OT  - BP stable  - Cont ASA/plavix  -Cont mucinex   - Dispo per [facility] surg  - DVT protocol    I have personally reviewed all pertinent labs and films that have officially resulted over the last 24 hours. I have personally checked for all pending labs that are awaiting final results. Subjective     Pt s/e @ bedside  No major events overnight  Having cough w/ some production  Denies CP or SOB    Objective     Visit Vitals  /79 (BP 1 Location: Left arm, BP Patient Position: At rest)   Pulse 84   Temp 98.1 °F (36.7 °C)   Resp 16   Ht 5' 4\" (1.626 m)   Wt 67.4 kg (148 lb 8 oz)   SpO2 100%   BMI 25.49 kg/m²       Physical Exam:  General Appearance: NAD, conversant  Lungs: CTA with normal respiratory effort  CV: RRR, no m/r/g  Abdomen: soft, non-tender, normal bowel sounds  Extremities: no cyanosis, L BKA wound C/D/I  Neuro: No focal deficits, motor/sensory intact    Lab/Data Reviewed:  BMP:   No results found for: NA, K, CL, CO2, AGAP, GLU, BUN, CREA, GFRAA, GFRNA  CBC:   No results found for: WBC, HGB, HGBEXT, HCT, HCTEXT, PLT, PLTEXT, HGBEXT, HCTEXT, PLTEXT    Imaging Reviewed:  No results found.     Medications Reviewed:  Current Facility-Administered Medications   Medication Dose Route Frequency    clopidogrel (PLAVIX) tablet 75 mg  75 mg Oral DAILY    amLODIPine (NORVASC) tablet 10 mg  10 mg Oral DAILY    allopurinol (ZYLOPRIM) tablet 100 mg  100 mg Oral DAILY    carvedilol (COREG) tablet 25 mg  25 mg Oral BID WITH MEALS    calcium carbonate (TUMS) chewable tablet 200 mg [elemental]  200 mg Oral TID PRN    docusate sodium (COLACE) capsule 100 mg  100 mg Oral DAILY    ferrous sulfate tablet 325 mg  1 Tab Oral DAILY WITH BREAKFAST    furosemide (LASIX) tablet 20 mg  20 mg Oral DAILY    guaiFENesin ER (MUCINEX) tablet 600 mg  600 mg Oral Q12H    hydrALAZINE (APRESOLINE) tablet 25 mg  25 mg Oral TID    gabapentin (NEURONTIN) capsule 400 mg  400 mg Oral TID    levETIRAcetam (KEPPRA) tablet 750 mg  750 mg Oral BID    memantine (NAMENDA) tablet 10 mg  10 mg Oral DAILY    tamsulosin (FLOMAX) capsule 0.4 mg  0.4 mg Oral DAILY    phenytoin ER (DILANTIN ER) ER capsule 100 mg  100 mg Oral TID    atorvastatin (LIPITOR) tablet 20 mg  20 mg Oral QHS    insulin lispro (HUMALOG) injection   SubCUTAneous AC&HS    albuterol-ipratropium (DUO-NEB) 2.5 MG-0.5 MG/3 ML  3 mL Nebulization Q6H PRN    oxyCODONE-acetaminophen (PERCOCET) 5-325 mg per tablet 1 Tab  1 Tab Oral Q4H PRN    oxyCODONE-acetaminophen (PERCOCET) 5-325 mg per tablet 2 Tab  2 Tab Oral Q4H PRN           Donavon Carrel, DO  Internal Medicine, Hospitalist  Pager: [phone]  [facility]

## 2018-12-25 NOTE — DISCHARGE INSTRUCTIONS
DISCHARGE SUMMARY from Nurse    PATIENT INSTRUCTIONS:    After general anesthesia or intravenous sedation, for 24 hours or while taking prescription Narcotics:  · Limit your activities  · Do not drive and operate hazardous machinery  · Do not make important personal or business decisions  · Do  not drink alcoholic beverages  · If you have not urinated within 8 hours after discharge, please contact your surgeon on call. Report the following to your surgeon:  · Excessive pain, swelling, redness or odor of or around the surgical area  · Temperature over 100.5  · Nausea and vomiting lasting longer than 4 hours or if unable to take medications  · Any signs of decreased circulation or nerve impairment to extremity: change in color, persistent  numbness, tingling, coldness or increase pain  · Any questions    What to do at Home:  Recommended activity: {discharge activity:75022}, ***    If you experience any of the following symptoms ***, please follow up with ***. *  Please give a list of your current medications to your Primary Care Provider. *  Please update this list whenever your medications are discontinued, doses are      changed, or new medications (including over-the-counter products) are added. *  Please carry medication information at all times in case of emergency situations. These are general instructions for a healthy lifestyle:    No smoking/ No tobacco products/ Avoid exposure to second hand smoke  Surgeon General's Warning:  Quitting smoking now greatly reduces serious risk to your health.     Obesity, smoking, and sedentary lifestyle greatly increases your risk for illness    A healthy diet, regular physical exercise & weight monitoring are important for maintaining a healthy lifestyle    You may be retaining fluid if you have a history of heart failure or if you experience any of the following symptoms:  Weight gain of 3 pounds or more overnight or 5 pounds in a week, increased swelling in our hands or feet or shortness of breath while lying flat in bed. Please call your doctor as soon as you notice any of these symptoms; do not wait until your next office visit. Recognize signs and symptoms of STROKE:    F-face looks uneven    A-arms unable to move or move unevenly    S-speech slurred or non-existent    T-time-call 911 as soon as signs and symptoms begin-DO NOT go       Back to bed or wait to see if you get better-TIME IS BRAIN. Warning Signs of HEART ATTACK     Call 911 if you have these symptoms:   Chest discomfort. Most heart attacks involve discomfort in the center of the chest that lasts more than a few minutes, or that goes away and comes back. It can feel like uncomfortable pressure, squeezing, fullness, or pain.  Discomfort in other areas of the upper body. Symptoms can include pain or discomfort in one or both arms, the back, neck, jaw, or stomach.  Shortness of breath with or without chest discomfort.  Other signs may include breaking out in a cold sweat, nausea, or lightheadedness. Don't wait more than five minutes to call 911 - MINUTES MATTER! Fast action can save your life. Calling 911 is almost always the fastest way to get lifesaving treatment. Emergency Medical Services staff can begin treatment when they arrive -- up to an hour sooner than if someone gets to the hospital by car. Patient {ARMBANDS:59868}  [employer] Activation    Thank you for requesting access to [employer]. Please follow the instructions below to securely access and download your online medical record. [employer] allows you to send messages to your doctor, view your test results, renew your prescriptions, schedule appointments, and more. How Do I Sign Up? 1. In your internet browser, go to [email or web address]  2. Click on the First Time User? Click Here link in the Sign In box. You will be redirect to the New Member Sign Up page. 3. Enter your [employer] Access Code exactly as it appears below.  You will not need to use this code after youve completed the sign-up process. If you do not sign up before the expiration date, you must request a new code. [employer] Access Code: [email or web address]  Expires: 3/14/2019  3:02 PM (This is the date your [employer] access code will )    4. Enter the last four digits of your Social Security Number (xxxx) and Date of Birth (mm/dd/yyyy) as indicated and click Submit. You will be taken to the next sign-up page. 5. Create a [employer] ID. This will be your [employer] login ID and cannot be changed, so think of one that is secure and easy to remember. 6. Create a [employer] password. You can change your password at any time. 7. Enter your Password Reset Question and Answer. This can be used at a later time if you forget your password. 8. Enter your e-mail address. You will receive e-mail notification when new information is available in [address] 9. Click Sign Up. You can now view and download portions of your medical record. 10. Click the Download Summary menu link to download a portable copy of your medical information. Additional Information    If you have questions, please visit the Frequently Asked Questions section of the [employer] website at [email or web address] Remember, [employer] is NOT to be used for urgent needs. For medical emergencies, dial 911. The discharge information has been reviewed with the patient and spouse. The {PATIENT PARENT GUARDIAN:37851} verbalized understanding. Discharge medications reviewed with the {Dishcarge meds reviewed VEVQ:31785} and appropriate educational materials and side effects teaching were provided.   ___________________________________________________________________________________________________________________________________

## 2018-12-25 NOTE — PROGRESS NOTES
Problem: Pressure Injury - Risk of  Goal: *Prevention of pressure injury  Document Lior Scale and appropriate interventions in the flowsheet.   Outcome: Progressing Towards Goal  Pressure Injury Interventions:       Moisture Interventions: Apply protective barrier, creams and emollients    Activity Interventions: Increase time out of bed    Mobility Interventions: HOB 30 degrees or less    Nutrition Interventions: Document food/fluid/supplement intake    Friction and Shear Interventions: HOB 30 degrees or less

## 2018-12-25 NOTE — PROGRESS NOTES
Bedside and Verbal shift change report given to benitez   (oncoming nurse) by Tonya Richter RN   (offgoing nurse). Report included the following information SBAR, Kardex, Intake/Output and MAR.

## 2018-12-25 NOTE — PROGRESS NOTES
0830  Seen by Dr Yoan Morales, for  Transfer to [facility] AR[facility]AGA can not do transfer summary yet at this time. Bland to be remove, pt moving all 3 extremities, right leg is weak and left BKA stump looks clean and staples intact. Prductive cough noted. 1030  Due to void given  Fluids, report to [facility] spoke to Kathi,  is [phone], informed Kathi, nurse  Due to void, had problem of urinating, posrt op unable to void,   Re inserted a bland and was taken out per order prior to  Transfer. No skin breakdown, stump no redness and no swelling , staples intact. 1230  Voided 300 cc post bladder scan, zero bladder scan. 1430  Voided another 250 cc, medicated for pain, transfer to [facility].

## 2018-12-25 NOTE — PROGRESS NOTES
Patient to transport today at 215 pm via [address] to [facility] and rehab. PCS form to nursing unit. DC summary Completed. Family aware and agree with discharge plan. Nursing to call report to NH and R at [phone].    Care Management Interventions  PCP Verified by CM: Yes(Dr Stanley at [facility])  Mode of Transport at Discharge: [address]  [address] Time of Discharge: [address] [facility] (CM Consult): SNF([facility] and rehab)  Partner SNF: Yes  Current Support Network: [address] [place] [address]  Confirm Follow Up Transport: Other (see comment)([facility] transit [phone])  Plan discussed with Pt/Family/Caregiver: Yes  Freedom of Choice Offered: Yes  [address] Provided?: No  Discharge Location  Discharge Placement: [address] [facility],[address] UP VISIT Appointment in: One Month (Order #[id number]) on 12/25/18

## 2019-01-23 NOTE — PERIOP NOTES
PAT - SURGICAL PRE-ADMISSION INSTRUCTIONS 
 
NAME:  Yani Kim                                                          TODAY'S DATE:  1/23/2019 SURGERY DATE:  1/24/2019                                  SURGERY ARRIVAL TIME:   1130 1. Do NOT eat or drink anything, including candy or gum, after MIDNIGHT on 01/23/19 , unless you have specific instructions from your Surgeon or Anesthesia Provider to do so. 2. No smoking on the day of surgery. 3. No alcohol 24 hours prior to the day of surgery. 4. No recreational drugs for one week prior to the day of surgery. 5. Leave all valuables, including money/purse, at home. 6. Remove all jewelry, nail polish, makeup (including mascara); no lotions, powders, deodorant, or perfume/cologne/after shave. 7. Glasses/Contact lenses and Dentures may be worn to the hospital.  They will be removed prior to surgery. 8. Call your doctor if symptoms of a cold or illness develop within 24 ours prior to surgery. 9. AN ADULT MUST DRIVE YOU HOME AFTER OUTPATIENT SURGERY. 10. If you are having an OUTPATIENT procedure, please make arrangements for a responsible adult to be with you for 24 hours after your surgery. 11. If you are admitted to the hospital, you will be assigned to a bed after surgery is complete. Normally a family member will not be able to see you until you are in your assigned bed. 15. Family is encouraged to accompany you to the hospital.  We ask visitors in the treatment area to be limited to ONE person at a time to ensure patient privacy. EXCEPTIONS WILL BE MADE AS NEEDED. 15. Children under 12 are discouraged from entering the treatment area and need to be supervised by an adult when in the waiting room. Special Instructions: Take these medications the morning of surgery with a sip of water:  Use Ellipta , Take Amlodipine ,Coreg , Hydralazine , Keppra , Dilantin before 0900. Patient Prep: 
 
shower with anti-bacterial soap These surgical instructions were reviewed with Aldair Fisher during the PAT phone call. Directions: On the morning of surgery, please go to the [address] [facility]. Enter the building from the [facility] entrance, 1st floor (next to the Emergency Room entrance). Take the elevator to the 2nd floor. Sign in at the Registration Desk. If you have any questions and/or concerns, please do not hesitate to call: 
(Prior to the day of surgery)  [facility] unit:  [phone] (Day of surgery)  [facility] unit:  [phone]

## 2019-01-24 ENCOUNTER — ANESTHESIA (OUTPATIENT)
Dept: SURGERY | Age: 74
End: 2019-01-24

## 2019-01-24 ENCOUNTER — ANESTHESIA EVENT (OUTPATIENT)
Dept: SURGERY | Age: 74
End: 2019-01-24

## 2019-01-24 ENCOUNTER — HOSPITAL ENCOUNTER (INPATIENT)
Age: 74
LOS: 4 days | Discharge: NURSING, REHAB OR LONG-TERM CARE | DRG: 498 | End: 2019-01-28
Attending: SURGERY | Admitting: SURGERY
Payer: MEDICARE

## 2019-01-24 DIAGNOSIS — I70.229 CRITICAL LOWER LIMB ISCHEMIA (HCC): Primary | ICD-10-CM

## 2019-01-24 PROBLEM — L97.922 LOWER EXTREMITY ULCERATION, LEFT, WITH FAT LAYER EXPOSED (HCC): Status: ACTIVE | Noted: 2019-01-24

## 2019-01-24 LAB
GLUCOSE BLD STRIP.AUTO-MCNC: 104 MG/DL (ref 70–110)
INR PPP: 1.1 (ref 0.8–1.2)
PROTHROMBIN TIME: 14.2 SEC (ref 11.5–15.2)

## 2019-01-24 PROCEDURE — 65270000029 HC RM PRIVATE

## 2019-01-24 PROCEDURE — 82962 GLUCOSE BLOOD TEST: CPT

## 2019-01-24 PROCEDURE — 74011250637 HC RX REV CODE- 250/637: Performed by: SURGERY

## 2019-01-24 PROCEDURE — 74011250636 HC RX REV CODE- 250/636: Performed by: SURGERY

## 2019-01-24 PROCEDURE — 77030027138 HC INCENT SPIROMETER -A

## 2019-01-24 PROCEDURE — 74011250637 HC RX REV CODE- 250/637: Performed by: NURSE ANESTHETIST, CERTIFIED REGISTERED

## 2019-01-24 PROCEDURE — 74011250636 HC RX REV CODE- 250/636: Performed by: NURSE ANESTHETIST, CERTIFIED REGISTERED

## 2019-01-24 PROCEDURE — 85610 PROTHROMBIN TIME: CPT

## 2019-01-24 RX ORDER — HEPARIN SODIUM 5000 [USP'U]/ML
5000 INJECTION, SOLUTION INTRAVENOUS; SUBCUTANEOUS EVERY 8 HOURS
Status: DISCONTINUED | OUTPATIENT
Start: 2019-01-25 | End: 2019-01-28 | Stop reason: HOSPADM

## 2019-01-24 RX ORDER — ALBUTEROL SULFATE 0.83 MG/ML
2.5 SOLUTION RESPIRATORY (INHALATION)
Status: DISCONTINUED | OUTPATIENT
Start: 2019-01-24 | End: 2019-01-28 | Stop reason: HOSPADM

## 2019-01-24 RX ORDER — MEMANTINE HYDROCHLORIDE 10 MG/1
10 TABLET ORAL DAILY
Status: DISCONTINUED | OUTPATIENT
Start: 2019-01-25 | End: 2019-01-28 | Stop reason: HOSPADM

## 2019-01-24 RX ORDER — CLOPIDOGREL BISULFATE 75 MG/1
75 TABLET ORAL DAILY
Status: DISCONTINUED | OUTPATIENT
Start: 2019-01-25 | End: 2019-01-28 | Stop reason: HOSPADM

## 2019-01-24 RX ORDER — INSULIN LISPRO 100 [IU]/ML
INJECTION, SOLUTION INTRAVENOUS; SUBCUTANEOUS ONCE
Status: DISCONTINUED | OUTPATIENT
Start: 2019-01-24 | End: 2019-01-24

## 2019-01-24 RX ORDER — FAMOTIDINE 20 MG/1
20 TABLET, FILM COATED ORAL ONCE
Status: COMPLETED | OUTPATIENT
Start: 2019-01-24 | End: 2019-01-24

## 2019-01-24 RX ORDER — LIDOCAINE HYDROCHLORIDE 10 MG/ML
0.1 INJECTION, SOLUTION EPIDURAL; INFILTRATION; INTRACAUDAL; PERINEURAL AS NEEDED
Status: DISCONTINUED | OUTPATIENT
Start: 2019-01-24 | End: 2019-01-24

## 2019-01-24 RX ORDER — SODIUM CHLORIDE 0.9 % (FLUSH) 0.9 %
5-40 SYRINGE (ML) INJECTION EVERY 8 HOURS
Status: DISCONTINUED | OUTPATIENT
Start: 2019-01-25 | End: 2019-01-28 | Stop reason: HOSPADM

## 2019-01-24 RX ORDER — FUROSEMIDE 20 MG/1
20 TABLET ORAL DAILY
Status: DISCONTINUED | OUTPATIENT
Start: 2019-01-25 | End: 2019-01-28 | Stop reason: HOSPADM

## 2019-01-24 RX ORDER — CEPHALEXIN 500 MG/1
500 CAPSULE ORAL 4 TIMES DAILY
COMMUNITY

## 2019-01-24 RX ORDER — DOCUSATE SODIUM 100 MG/1
100 CAPSULE, LIQUID FILLED ORAL DAILY
Status: DISCONTINUED | OUTPATIENT
Start: 2019-01-25 | End: 2019-01-28 | Stop reason: HOSPADM

## 2019-01-24 RX ORDER — ACETAMINOPHEN 325 MG/1
650 TABLET ORAL
Status: DISCONTINUED | OUTPATIENT
Start: 2019-01-24 | End: 2019-01-28 | Stop reason: HOSPADM

## 2019-01-24 RX ORDER — OXYCODONE AND ACETAMINOPHEN 5; 325 MG/1; MG/1
1 TABLET ORAL
Status: DISCONTINUED | OUTPATIENT
Start: 2019-01-24 | End: 2019-01-28 | Stop reason: HOSPADM

## 2019-01-24 RX ORDER — COLCHICINE 0.6 MG/1
0.6 TABLET ORAL DAILY
Status: DISCONTINUED | OUTPATIENT
Start: 2019-01-25 | End: 2019-01-28 | Stop reason: HOSPADM

## 2019-01-24 RX ORDER — OLMESARTAN MEDOXOMIL 20 MG/1
40 TABLET ORAL DAILY
Status: DISCONTINUED | OUTPATIENT
Start: 2019-01-25 | End: 2019-01-28 | Stop reason: HOSPADM

## 2019-01-24 RX ORDER — FAMOTIDINE 20 MG/1
20 TABLET, FILM COATED ORAL 2 TIMES DAILY
Status: DISCONTINUED | OUTPATIENT
Start: 2019-01-25 | End: 2019-01-28 | Stop reason: HOSPADM

## 2019-01-24 RX ORDER — SODIUM CHLORIDE, SODIUM LACTATE, POTASSIUM CHLORIDE, CALCIUM CHLORIDE 600; 310; 30; 20 MG/100ML; MG/100ML; MG/100ML; MG/100ML
25 INJECTION, SOLUTION INTRAVENOUS CONTINUOUS
Status: DISCONTINUED | OUTPATIENT
Start: 2019-01-24 | End: 2019-01-24

## 2019-01-24 RX ORDER — CARVEDILOL 25 MG/1
25 TABLET ORAL 2 TIMES DAILY WITH MEALS
Status: DISCONTINUED | OUTPATIENT
Start: 2019-01-24 | End: 2019-01-28 | Stop reason: HOSPADM

## 2019-01-24 RX ORDER — AMLODIPINE BESYLATE 5 MG/1
5 TABLET ORAL DAILY
Status: DISCONTINUED | OUTPATIENT
Start: 2019-01-25 | End: 2019-01-24

## 2019-01-24 RX ORDER — CEFAZOLIN SODIUM 2 G/50ML
2 SOLUTION INTRAVENOUS
Status: COMPLETED | OUTPATIENT
Start: 2019-01-24 | End: 2019-01-24

## 2019-01-24 RX ORDER — GABAPENTIN 400 MG/1
400 CAPSULE ORAL 3 TIMES DAILY
Status: DISCONTINUED | OUTPATIENT
Start: 2019-01-24 | End: 2019-01-28 | Stop reason: HOSPADM

## 2019-01-24 RX ORDER — SODIUM CHLORIDE 0.9 % (FLUSH) 0.9 %
5-40 SYRINGE (ML) INJECTION AS NEEDED
Status: DISCONTINUED | OUTPATIENT
Start: 2019-01-24 | End: 2019-01-28 | Stop reason: HOSPADM

## 2019-01-24 RX ORDER — ALLOPURINOL 100 MG/1
100 TABLET ORAL DAILY
Status: DISCONTINUED | OUTPATIENT
Start: 2019-01-25 | End: 2019-01-28 | Stop reason: HOSPADM

## 2019-01-24 RX ORDER — DEXTROMETHORPHAN HYDROBROMIDE, GUAIFENESIN 5; 100 MG/5ML; MG/5ML
650 LIQUID ORAL
Status: DISCONTINUED | OUTPATIENT
Start: 2019-01-24 | End: 2019-01-24 | Stop reason: RX

## 2019-01-24 RX ORDER — ATORVASTATIN CALCIUM 10 MG/1
40 TABLET, FILM COATED ORAL
Status: DISCONTINUED | OUTPATIENT
Start: 2019-01-24 | End: 2019-01-28 | Stop reason: HOSPADM

## 2019-01-24 RX ORDER — TAMSULOSIN HYDROCHLORIDE 0.4 MG/1
0.4 CAPSULE ORAL DAILY
Status: DISCONTINUED | OUTPATIENT
Start: 2019-01-25 | End: 2019-01-28 | Stop reason: HOSPADM

## 2019-01-24 RX ORDER — AMLODIPINE BESYLATE 5 MG/1
5 TABLET ORAL DAILY
Status: DISCONTINUED | OUTPATIENT
Start: 2019-01-24 | End: 2019-01-28 | Stop reason: HOSPADM

## 2019-01-24 RX ADMIN — AMLODIPINE BESYLATE 5 MG: 5 TABLET ORAL at 17:24

## 2019-01-24 RX ADMIN — ATORVASTATIN CALCIUM 40 MG: 10 TABLET, FILM COATED ORAL at 21:38

## 2019-01-24 RX ADMIN — ACETAMINOPHEN 650 MG: 325 TABLET, FILM COATED ORAL at 21:00

## 2019-01-24 RX ADMIN — FAMOTIDINE 20 MG: 20 TABLET ORAL at 17:23

## 2019-01-24 RX ADMIN — CEFAZOLIN SODIUM 2 G: 2 SOLUTION INTRAVENOUS at 17:24

## 2019-01-24 RX ADMIN — CARVEDILOL 25 MG: 25 TABLET, FILM COATED ORAL at 17:23

## 2019-01-24 RX ADMIN — GABAPENTIN 400 MG: 400 CAPSULE ORAL at 21:00

## 2019-01-24 RX ADMIN — SODIUM CHLORIDE, SODIUM LACTATE, POTASSIUM CHLORIDE, AND CALCIUM CHLORIDE 25 ML/HR: 600; 310; 30; 20 INJECTION, SOLUTION INTRAVENOUS at 14:40

## 2019-01-24 RX ADMIN — GABAPENTIN 400 MG: 400 CAPSULE ORAL at 17:23

## 2019-01-24 NOTE — PERIOP NOTES
Per Dr. Dominique Kunz, patient's surgery rescheduled for tomm. Patient stated that he \"ate 5 doritos this morning at 10:30 with a little bit of water\". To be admitted on floor. Wound left AKA with small amount of bloody drainage, staples and 4 x 4s intact. Patient without any complaints at this time.

## 2019-01-24 NOTE — PERIOP NOTES
TRANSFER - OUT REPORT: 
 
Verbal report given to Angeline Lemons  being transferred to  [address] for routine progression of care Report consisted of patients Situation, Background, Assessment and  
Recommendations(SBAR). Information from the following report(s) SBAR was reviewed with the receiving nurse. Spoke to Dr Santi Reyes about the patient elevated Blood pressure and pulse and pain. He did a telephone order to give  A percocet, his norvasc and his coreg (dosage according to his home meds). Patient can be npo after midnight and can eat and drink before midnight. Lines:  
Peripheral IV 01/24/19 Right Hand (Active) Site Assessment Clean, dry, & intact 1/24/2019  2:00 PM  
Phlebitis Assessment 0 1/24/2019  2:00 PM  
Infiltration Assessment 0 1/24/2019  2:00 PM  
Dressing Status Clean, dry, & intact 1/24/2019  2:00 PM  
Dressing Type Tape;Transparent 1/24/2019  2:00 PM  
Hub Color/Line Status Pink; Infusing 1/24/2019  2:00 PM  
  
 
Opportunity for questions and clarification was provided. Patient transported with:  Wheelchair and clothing. Tech

## 2019-01-24 NOTE — H&P
[facility] 
[address] [facility] [place], [address] [facility] Dr. Abelino García, Dr. Sapna Mohr, Dr. Yasir Howard [phone] FAX# [phone] H/P Patient: Jessie Mendez MRN: [id number]  SSN: [id number] YOB: 1945  Age: 74 y.o. Sex: male Subjective: Jessie Mendez is a 74 y.o. male who is being seen for ischemic left AKA wound. Patient is s/p left AKA due to severe PVD with occluded femoral artery. He developed ischemic changes on lateral and medial wounds without signs of infection. Past Medical History:  
Diagnosis Date  Anemia  Arthropathy  BPH (benign prostatic hypertrophy)  Diabetes mellitus ([address]  Difficulty urinating  DVT (deep venous thrombosis) ([address]  Elevated cholesterol  Esophageal reflux  GERD (gastroesophageal reflux disease)  Gout  Heartburn  Hypercholesteremia  Hyperlipidemia  Hypertension  Hypertrophy of prostate with urinary obstruction and other lower urinary tract symptoms (LUTS)  Hyponatremia  Loose, teeth  Nocturia  PAD (peripheral artery disease) ([address]  Peptic ulcer of stomach  Pneumonia  Prostate disease  Seizures ([address]  Unspecified epilepsy without mention of intractable epilepsy Past Surgical History:  
Procedure Laterality Date  HX CATARACT REMOVAL    
 HX COLONOSCOPY    
 HX CYST REMOVAL    
 HX OTHER SURGICAL  04/11/2008 - Dr. Isra Bower  
 lower extremity bypass surgery  HX SMALL BOWEL RESECTION    
 VASCULAR SURGERY PROCEDURE UNLIST  12/2018 Left AKA Family History Problem Relation Age of Onset  Cancer Mother Social History Tobacco Use  Smoking status: Current Every Day Smoker Packs/day: 0.30 Types: Cigarettes  Smokeless tobacco: Never Used Substance Use Topics  Alcohol use: No  
  
  
 
No Known Allergies Review of Systems: A comprehensive review of systems was negative. Objective:  
 
Vitals:  
 01/23/19 1603 Weight: 57.2 kg (126 lb) Height: 5' 10\" (1.778 m) Physical Exam: 
GENERAL: alert, cooperative, no distress, appears stated age EYE: negative LYMPHATIC: Cervical, supraclavicular, and axillary nodes normal.  
THROAT & NECK: normal and no erythema or exudates noted. LUNG:  diminished breath sounds R base, L base HEART: regular rate and rhythm, S1, S2 normal, no murmur, click, rub or gallop ABDOMEN: soft, non-tender. Bowel sounds normal. No masses,  no organomegaly EXTREMITIES:  edema Left AKA wound with medial and lateral wound necrotic skin, no purulent drainage. SKIN: compromise ischemic skin of left AKA. NEUROLOGIC: AOx3. Gait normal. Reflexes and motor strength normal and symmetric. Cranial nerves 2-12 and sensation grossly intact. PSYCHIATRIC: non focal 
 
 
Assessment:  
 
Hospital Problems  Date Reviewed: 9/8/2016 Codes Class Noted POA Lower extremity ulceration, left, with fat layer exposed ([facility] ICD-10-CM: Q67.586 ICD-9-CM: 707.10  1/24/2019 Unknown Plan:  
NPO/IV fluids Admit to surgical floor post op. To OR for left AKA wound debridement washout. Unfortunately patient had food about 4 hrs ago so we will be unable to perform surgery. Will do debridement tomorrow am.  
Continue all home medications. Signed By: Crystal Rea MD   
 January 24, 2019

## 2019-01-24 NOTE — ANESTHESIA PREPROCEDURE EVALUATION
Anesthetic History No history of anesthetic complications Review of Systems / Medical History Patient summary reviewed and pertinent labs reviewed Pulmonary Smoker Neuro/Psych  
 
seizures Cardiovascular Hypertension PAD 
 
 
  
GI/Hepatic/Renal 
  
GERD 
 
 
PUD Endo/Other Diabetes: type 2 Other Findings Physical Exam 
 
Airway Mallampati: III 
TM Distance: 4 - 6 cm Neck ROM: decreased range of motion Mouth opening: Diminished (comment) Cardiovascular Rhythm: regular Rate: normal 
 
 
 
 Dental 
 
Dentition: Poor dentition Pulmonary Breath sounds clear to auscultation Abdominal 
GI exam deferred Other Findings Anesthetic Plan ASA: 3 Anesthesia type: general 
 
 
 
 
Induction: Intravenous Anesthetic plan and risks discussed with: Patient

## 2019-01-25 ENCOUNTER — ANESTHESIA EVENT (OUTPATIENT)
Dept: SURGERY | Age: 74
DRG: 498 | End: 2019-01-25
Payer: MEDICARE

## 2019-01-25 ENCOUNTER — ANESTHESIA (OUTPATIENT)
Dept: SURGERY | Age: 74
DRG: 498 | End: 2019-01-25
Payer: MEDICARE

## 2019-01-25 PROBLEM — T14.8XXA WOUND INFECTION: Status: ACTIVE | Noted: 2019-01-25

## 2019-01-25 PROBLEM — D64.9 ANEMIA: Status: ACTIVE | Noted: 2019-01-25

## 2019-01-25 PROBLEM — L08.9 WOUND INFECTION: Status: ACTIVE | Noted: 2019-01-25

## 2019-01-25 LAB
ABO + RH BLD: NORMAL
ANION GAP SERPL CALC-SCNC: 9 MMOL/L (ref 3–18)
BASOPHILS # BLD: 0 K/UL (ref 0–0.1)
BASOPHILS NFR BLD: 0 % (ref 0–2)
BLOOD GROUP ANTIBODIES SERPL: NORMAL
BUN SERPL-MCNC: 22 MG/DL (ref 7–18)
BUN/CREAT SERPL: 17 (ref 12–20)
CALCIUM SERPL-MCNC: 8.4 MG/DL (ref 8.5–10.1)
CHLORIDE SERPL-SCNC: 105 MMOL/L (ref 100–108)
CO2 SERPL-SCNC: 22 MMOL/L (ref 21–32)
CREAT SERPL-MCNC: 1.3 MG/DL (ref 0.6–1.3)
DIFFERENTIAL METHOD BLD: ABNORMAL
EOSINOPHIL # BLD: 0.1 K/UL (ref 0–0.4)
EOSINOPHIL NFR BLD: 2 % (ref 0–5)
ERYTHROCYTE [DISTWIDTH] IN BLOOD BY AUTOMATED COUNT: 15.3 % (ref 11.6–14.5)
GLUCOSE BLD STRIP.AUTO-MCNC: 88 MG/DL (ref 70–110)
GLUCOSE BLD STRIP.AUTO-MCNC: 92 MG/DL (ref 70–110)
GLUCOSE SERPL-MCNC: 88 MG/DL (ref 74–99)
HCT VFR BLD AUTO: 26.7 % (ref 36–48)
HGB BLD-MCNC: 8.6 G/DL (ref 13–16)
LYMPHOCYTES # BLD: 1.6 K/UL (ref 0.9–3.6)
LYMPHOCYTES NFR BLD: 28 % (ref 21–52)
MCH RBC QN AUTO: 30.6 PG (ref 24–34)
MCHC RBC AUTO-ENTMCNC: 32.2 G/DL (ref 31–37)
MCV RBC AUTO: 95 FL (ref 74–97)
MONOCYTES # BLD: 1.2 K/UL (ref 0.05–1.2)
MONOCYTES NFR BLD: 21 % (ref 3–10)
NEUTS SEG # BLD: 2.9 K/UL (ref 1.8–8)
NEUTS SEG NFR BLD: 49 % (ref 40–73)
PLATELET # BLD AUTO: 198 K/UL (ref 135–420)
PMV BLD AUTO: 10.3 FL (ref 9.2–11.8)
POTASSIUM SERPL-SCNC: 3.9 MMOL/L (ref 3.5–5.5)
RBC # BLD AUTO: 2.81 M/UL (ref 4.7–5.5)
SODIUM SERPL-SCNC: 136 MMOL/L (ref 136–145)
SPECIMEN EXP DATE BLD: NORMAL
WBC # BLD AUTO: 5.9 K/UL (ref 4.6–13.2)

## 2019-01-25 PROCEDURE — 77030002996 HC SUT SLK J&J -A: Performed by: SURGERY

## 2019-01-25 PROCEDURE — 86900 BLOOD TYPING SEROLOGIC ABO: CPT

## 2019-01-25 PROCEDURE — 36415 COLL VENOUS BLD VENIPUNCTURE: CPT

## 2019-01-25 PROCEDURE — 77030008462 HC STPLR SKN PROX J&J -A: Performed by: SURGERY

## 2019-01-25 PROCEDURE — 76210000016 HC OR PH I REC 1 TO 1.5 HR: Performed by: SURGERY

## 2019-01-25 PROCEDURE — 74011250636 HC RX REV CODE- 250/636

## 2019-01-25 PROCEDURE — 77030011267 HC ELECTRD BLD COVD -A: Performed by: SURGERY

## 2019-01-25 PROCEDURE — 82962 GLUCOSE BLOOD TEST: CPT

## 2019-01-25 PROCEDURE — 0QBC0ZZ EXCISION OF LEFT LOWER FEMUR, OPEN APPROACH: ICD-10-PCS | Performed by: SURGERY

## 2019-01-25 PROCEDURE — 74011250637 HC RX REV CODE- 250/637: Performed by: HOSPITALIST

## 2019-01-25 PROCEDURE — 85025 COMPLETE CBC W/AUTO DIFF WBC: CPT

## 2019-01-25 PROCEDURE — 77030018836 HC SOL IRR NACL ICUM -A: Performed by: SURGERY

## 2019-01-25 PROCEDURE — 80048 BASIC METABOLIC PNL TOTAL CA: CPT

## 2019-01-25 PROCEDURE — 74011250636 HC RX REV CODE- 250/636: Performed by: SURGERY

## 2019-01-25 PROCEDURE — 74011250636 HC RX REV CODE- 250/636: Performed by: NURSE ANESTHETIST, CERTIFIED REGISTERED

## 2019-01-25 PROCEDURE — 74011250637 HC RX REV CODE- 250/637: Performed by: SURGERY

## 2019-01-25 PROCEDURE — 77030012510 HC MSK AIRWY LMA TELE -B: Performed by: ANESTHESIOLOGY

## 2019-01-25 PROCEDURE — 74011000272 HC RX REV CODE- 272: Performed by: SURGERY

## 2019-01-25 PROCEDURE — 77030013079 HC BLNKT BAIR HGGR 3M -A: Performed by: ANESTHESIOLOGY

## 2019-01-25 PROCEDURE — 76010000149 HC OR TIME 1 TO 1.5 HR: Performed by: SURGERY

## 2019-01-25 PROCEDURE — 76060000033 HC ANESTHESIA 1 TO 1.5 HR: Performed by: SURGERY

## 2019-01-25 PROCEDURE — 77030018390 HC SPNG HEMSTAT2 J&J -B: Performed by: SURGERY

## 2019-01-25 PROCEDURE — 74011000250 HC RX REV CODE- 250: Performed by: SURGERY

## 2019-01-25 PROCEDURE — 77030032490 HC SLV COMPR SCD KNE COVD -B: Performed by: SURGERY

## 2019-01-25 PROCEDURE — 65270000029 HC RM PRIVATE

## 2019-01-25 PROCEDURE — 77030031139 HC SUT VCRL2 J&J -A: Performed by: SURGERY

## 2019-01-25 RX ORDER — HYDROMORPHONE HYDROCHLORIDE 1 MG/ML
INJECTION, SOLUTION INTRAMUSCULAR; INTRAVENOUS; SUBCUTANEOUS AS NEEDED
Status: DISCONTINUED | OUTPATIENT
Start: 2019-01-25 | End: 2019-01-25 | Stop reason: HOSPADM

## 2019-01-25 RX ORDER — DOXYCYCLINE 100 MG/1
100 CAPSULE ORAL EVERY 12 HOURS
Status: DISCONTINUED | OUTPATIENT
Start: 2019-01-25 | End: 2019-01-28 | Stop reason: HOSPADM

## 2019-01-25 RX ORDER — SODIUM CHLORIDE, SODIUM LACTATE, POTASSIUM CHLORIDE, CALCIUM CHLORIDE 600; 310; 30; 20 MG/100ML; MG/100ML; MG/100ML; MG/100ML
50 INJECTION, SOLUTION INTRAVENOUS CONTINUOUS
Status: DISCONTINUED | OUTPATIENT
Start: 2019-01-25 | End: 2019-01-25 | Stop reason: HOSPADM

## 2019-01-25 RX ORDER — DEXAMETHASONE SODIUM PHOSPHATE 4 MG/ML
INJECTION, SOLUTION INTRA-ARTICULAR; INTRALESIONAL; INTRAMUSCULAR; INTRAVENOUS; SOFT TISSUE AS NEEDED
Status: DISCONTINUED | OUTPATIENT
Start: 2019-01-25 | End: 2019-01-25 | Stop reason: HOSPADM

## 2019-01-25 RX ORDER — LIDOCAINE HYDROCHLORIDE 20 MG/ML
INJECTION, SOLUTION EPIDURAL; INFILTRATION; INTRACAUDAL; PERINEURAL AS NEEDED
Status: DISCONTINUED | OUTPATIENT
Start: 2019-01-25 | End: 2019-01-25 | Stop reason: HOSPADM

## 2019-01-25 RX ORDER — CEFAZOLIN SODIUM 2 G/50ML
2 SOLUTION INTRAVENOUS EVERY 8 HOURS
Status: COMPLETED | OUTPATIENT
Start: 2019-01-25 | End: 2019-01-26

## 2019-01-25 RX ORDER — FENTANYL CITRATE 50 UG/ML
INJECTION, SOLUTION INTRAMUSCULAR; INTRAVENOUS AS NEEDED
Status: DISCONTINUED | OUTPATIENT
Start: 2019-01-25 | End: 2019-01-25

## 2019-01-25 RX ORDER — FENTANYL CITRATE 50 UG/ML
INJECTION, SOLUTION INTRAMUSCULAR; INTRAVENOUS AS NEEDED
Status: DISCONTINUED | OUTPATIENT
Start: 2019-01-25 | End: 2019-01-25 | Stop reason: HOSPADM

## 2019-01-25 RX ORDER — PROPOFOL 10 MG/ML
INJECTION, EMULSION INTRAVENOUS AS NEEDED
Status: DISCONTINUED | OUTPATIENT
Start: 2019-01-25 | End: 2019-01-25 | Stop reason: HOSPADM

## 2019-01-25 RX ORDER — MIDAZOLAM HYDROCHLORIDE 1 MG/ML
INJECTION, SOLUTION INTRAMUSCULAR; INTRAVENOUS AS NEEDED
Status: DISCONTINUED | OUTPATIENT
Start: 2019-01-25 | End: 2019-01-25 | Stop reason: HOSPADM

## 2019-01-25 RX ORDER — MAGNESIUM SULFATE 100 %
4 CRYSTALS MISCELLANEOUS AS NEEDED
Status: DISCONTINUED | OUTPATIENT
Start: 2019-01-25 | End: 2019-01-25 | Stop reason: HOSPADM

## 2019-01-25 RX ORDER — INSULIN LISPRO 100 [IU]/ML
INJECTION, SOLUTION INTRAVENOUS; SUBCUTANEOUS ONCE
Status: DISCONTINUED | OUTPATIENT
Start: 2019-01-25 | End: 2019-01-25 | Stop reason: HOSPADM

## 2019-01-25 RX ORDER — SODIUM CHLORIDE, SODIUM LACTATE, POTASSIUM CHLORIDE, CALCIUM CHLORIDE 600; 310; 30; 20 MG/100ML; MG/100ML; MG/100ML; MG/100ML
INJECTION, SOLUTION INTRAVENOUS
Status: DISCONTINUED | OUTPATIENT
Start: 2019-01-25 | End: 2019-01-25 | Stop reason: HOSPADM

## 2019-01-25 RX ORDER — FENTANYL CITRATE 50 UG/ML
50 INJECTION, SOLUTION INTRAMUSCULAR; INTRAVENOUS AS NEEDED
Status: DISCONTINUED | OUTPATIENT
Start: 2019-01-25 | End: 2019-01-25 | Stop reason: HOSPADM

## 2019-01-25 RX ORDER — CEFAZOLIN SODIUM 2 G/50ML
2 SOLUTION INTRAVENOUS ONCE
Status: COMPLETED | OUTPATIENT
Start: 2019-01-25 | End: 2019-01-25

## 2019-01-25 RX ORDER — DEXTROSE MONOHYDRATE 25 G/50ML
25-50 INJECTION, SOLUTION INTRAVENOUS AS NEEDED
Status: DISCONTINUED | OUTPATIENT
Start: 2019-01-25 | End: 2019-01-25 | Stop reason: HOSPADM

## 2019-01-25 RX ORDER — ONDANSETRON 2 MG/ML
4 INJECTION INTRAMUSCULAR; INTRAVENOUS ONCE
Status: DISCONTINUED | OUTPATIENT
Start: 2019-01-25 | End: 2019-01-25 | Stop reason: HOSPADM

## 2019-01-25 RX ORDER — ONDANSETRON 2 MG/ML
INJECTION INTRAMUSCULAR; INTRAVENOUS AS NEEDED
Status: DISCONTINUED | OUTPATIENT
Start: 2019-01-25 | End: 2019-01-25 | Stop reason: HOSPADM

## 2019-01-25 RX ORDER — HYDROMORPHONE HYDROCHLORIDE 2 MG/ML
0.2 INJECTION, SOLUTION INTRAMUSCULAR; INTRAVENOUS; SUBCUTANEOUS
Status: DISCONTINUED | OUTPATIENT
Start: 2019-01-25 | End: 2019-01-25 | Stop reason: HOSPADM

## 2019-01-25 RX ADMIN — OXYCODONE AND ACETAMINOPHEN 1 TABLET: 5; 325 TABLET ORAL at 20:52

## 2019-01-25 RX ADMIN — FENTANYL CITRATE 50 MCG: 50 INJECTION, SOLUTION INTRAMUSCULAR; INTRAVENOUS at 09:13

## 2019-01-25 RX ADMIN — LIDOCAINE HYDROCHLORIDE 50 MG: 20 INJECTION, SOLUTION EPIDURAL; INFILTRATION; INTRACAUDAL; PERINEURAL at 07:44

## 2019-01-25 RX ADMIN — HYDROMORPHONE HYDROCHLORIDE 0.25 MG: 1 INJECTION, SOLUTION INTRAMUSCULAR; INTRAVENOUS; SUBCUTANEOUS at 08:11

## 2019-01-25 RX ADMIN — Medication 10 ML: at 21:25

## 2019-01-25 RX ADMIN — SODIUM CHLORIDE, SODIUM LACTATE, POTASSIUM CHLORIDE, CALCIUM CHLORIDE: 600; 310; 30; 20 INJECTION, SOLUTION INTRAVENOUS at 07:38

## 2019-01-25 RX ADMIN — ATORVASTATIN CALCIUM 40 MG: 10 TABLET, FILM COATED ORAL at 21:25

## 2019-01-25 RX ADMIN — Medication 10 ML: at 15:34

## 2019-01-25 RX ADMIN — HYDROMORPHONE HYDROCHLORIDE 0.2 MG: 2 INJECTION INTRAMUSCULAR; INTRAVENOUS; SUBCUTANEOUS at 09:29

## 2019-01-25 RX ADMIN — FENTANYL CITRATE 50 MCG: 50 INJECTION, SOLUTION INTRAMUSCULAR; INTRAVENOUS at 09:02

## 2019-01-25 RX ADMIN — Medication 10 ML: at 00:00

## 2019-01-25 RX ADMIN — ONDANSETRON 4 MG: 2 INJECTION INTRAMUSCULAR; INTRAVENOUS at 08:31

## 2019-01-25 RX ADMIN — GABAPENTIN 400 MG: 400 CAPSULE ORAL at 21:25

## 2019-01-25 RX ADMIN — MIDAZOLAM HYDROCHLORIDE 0.5 MG: 1 INJECTION, SOLUTION INTRAMUSCULAR; INTRAVENOUS at 07:44

## 2019-01-25 RX ADMIN — SODIUM CHLORIDE, SODIUM LACTATE, POTASSIUM CHLORIDE, CALCIUM CHLORIDE: 600; 310; 30; 20 INJECTION, SOLUTION INTRAVENOUS at 08:13

## 2019-01-25 RX ADMIN — OXYCODONE AND ACETAMINOPHEN 1 TABLET: 5; 325 TABLET ORAL at 15:25

## 2019-01-25 RX ADMIN — DOXYCYCLINE 100 MG: 100 CAPSULE ORAL at 20:53

## 2019-01-25 RX ADMIN — GABAPENTIN 400 MG: 400 CAPSULE ORAL at 15:25

## 2019-01-25 RX ADMIN — DOCUSATE SODIUM 100 MG: 100 CAPSULE, LIQUID FILLED ORAL at 18:42

## 2019-01-25 RX ADMIN — CEFAZOLIN SODIUM 2 G: 2 SOLUTION INTRAVENOUS at 07:47

## 2019-01-25 RX ADMIN — HEPARIN SODIUM 5000 UNITS: 5000 INJECTION INTRAVENOUS; SUBCUTANEOUS at 07:34

## 2019-01-25 RX ADMIN — Medication 10 ML: at 06:00

## 2019-01-25 RX ADMIN — CEFAZOLIN SODIUM 2 G: 2 SOLUTION INTRAVENOUS at 15:25

## 2019-01-25 RX ADMIN — LEVETIRACETAM 750 MG: 500 TABLET, FILM COATED ORAL at 18:27

## 2019-01-25 RX ADMIN — DEXAMETHASONE SODIUM PHOSPHATE 4 MG: 4 INJECTION, SOLUTION INTRA-ARTICULAR; INTRALESIONAL; INTRAMUSCULAR; INTRAVENOUS; SOFT TISSUE at 07:54

## 2019-01-25 RX ADMIN — FENTANYL CITRATE 25 MCG: 50 INJECTION, SOLUTION INTRAMUSCULAR; INTRAVENOUS at 07:45

## 2019-01-25 RX ADMIN — HYDROMORPHONE HYDROCHLORIDE 0.25 MG: 1 INJECTION, SOLUTION INTRAMUSCULAR; INTRAVENOUS; SUBCUTANEOUS at 08:07

## 2019-01-25 RX ADMIN — HYDROMORPHONE HYDROCHLORIDE 0.25 MG: 1 INJECTION, SOLUTION INTRAMUSCULAR; INTRAVENOUS; SUBCUTANEOUS at 08:08

## 2019-01-25 RX ADMIN — HEPARIN SODIUM 5000 UNITS: 5000 INJECTION INTRAVENOUS; SUBCUTANEOUS at 15:25

## 2019-01-25 RX ADMIN — FENTANYL CITRATE 25 MCG: 50 INJECTION, SOLUTION INTRAMUSCULAR; INTRAVENOUS at 07:54

## 2019-01-25 RX ADMIN — DOXYCYCLINE 100 MG: 100 CAPSULE ORAL at 15:25

## 2019-01-25 RX ADMIN — PROPOFOL 150 MG: 10 INJECTION, EMULSION INTRAVENOUS at 07:44

## 2019-01-25 RX ADMIN — FENTANYL CITRATE 25 MCG: 50 INJECTION, SOLUTION INTRAMUSCULAR; INTRAVENOUS at 08:01

## 2019-01-25 RX ADMIN — FENTANYL CITRATE 25 MCG: 50 INJECTION, SOLUTION INTRAMUSCULAR; INTRAVENOUS at 08:00

## 2019-01-25 RX ADMIN — HYDROMORPHONE HYDROCHLORIDE 0.25 MG: 1 INJECTION, SOLUTION INTRAMUSCULAR; INTRAVENOUS; SUBCUTANEOUS at 08:15

## 2019-01-25 RX ADMIN — FAMOTIDINE 20 MG: 20 TABLET ORAL at 18:28

## 2019-01-25 NOTE — PROGRESS NOTES
Caller: Patient  C/B # [phone]  Dr Moura Draft    Patient will be seen in the office on 1/16/19 and is asking if we can send a form to have his records sent to us  He asked the provider and was told that we can have info sent quicker if the request is from a provider  He was seeing Dr Alessio Tran for 20 years and is asking if we can send over a medical record request form to  fax [phone]    Thanks Received Bedside and Verbal shift change report from Jovita Sicard, RN report included the following information SBAR, Kardex, Intake/Output and MAR. Pt laying in the bed alert and verbal denied acute distress voiced feeling been cold extra blanket provided. Call bell and fluid within reach will continue to monitor. 2026 MEWS score noted 4 d/t pt HR and temp. no prn tylenol available for fever paged and spoke to MD Melissa Dozier and received telephone verbal order with read back for tylenol 650 mg q 6 hrs PRN for fever. 0120 Pt remain stable NPO for pending procedure vs stable resting call bell within reach will continue to monitor. 0400  On unit CHG wipes done pre-procedure checklist completed,  New gown and linen for bed pt tolerated NAD noted call bell within reach. 0600 Pt picked up for OR, stable NAD noted. Verbal shift change report given to Jovita Sicard RN (oncoming nurse) by Gurjit Barrientos RN (offgoing nurse). Report included the following information SBAR, Kardex, Intake/Output and MAR and pt being transferred to OR.

## 2019-01-25 NOTE — ANESTHESIA PREPROCEDURE EVALUATION
Anesthetic History No history of anesthetic complications Review of Systems / Medical History Patient summary reviewed and pertinent labs reviewed Pulmonary Smoker Neuro/Psych  
 
seizures: well controlled Cardiovascular Hypertension: poorly controlled PAD Exercise tolerance: <4 METS 
  
GI/Hepatic/Renal 
  
GERD: poorly controlled PUD Endo/Other Diabetes: type 2, using insulin Anemia Other Findings Physical Exam 
 
Airway Mallampati: III 
TM Distance: > 6 cm Neck ROM: normal range of motion Mouth opening: Normal 
 
 Cardiovascular Regular rate and rhythm,  S1 and S2 normal,  no murmur, click, rub, or gallop Dental 
 
Dentition: Poor dentition, Upper partial plate and Lower partial plate Pulmonary Breath sounds clear to auscultation Abdominal 
GI exam deferred Other Findings Anesthetic Plan ASA: 4 Anesthesia type: general 
 
 
Post-op pain plan if not by surgeon: IV PCA Induction: Intravenous Anesthetic plan and risks discussed with: Patient

## 2019-01-25 NOTE — OP NOTES
[facility]  [address], [address] [facility]  Dr. Brandon Rousseau, Dr. Nirmal Thakkar, Dr. Kody Gay  [phone] FAX# [phone]    1/25/2019    Hospital: [facility]   Surgeon(s): Arley Peraza MD  Pre-operative Diagnosis: gangrene  left foot  Post-operative Diagnosis: Same  Procedure(s) Performed:  Revision of left above the knee amputation. Anesthesia:  GETA  Findings: Left foot with extensive tissue loss and gangrene  Complications: None  Estimated Blood Loss:  100mL  Tubes and Drains:  none  Specimens: * No specimens in log *     Procedure in Detail: After informed consent was obtained, the patient was taken to the operating room and placed supine on the table. General endotracheal anesthesia was induced. The  left  Lower extremity was prepped with chlorhexidine and draped in the usual sterile fashion. A skin incision at the left lateral wound and soft tissue debrided. The bone was adequately covered but the tissue over it was compromised. The skin and soft tissue were divided. Hemostasis was achieved with Bovie Cautery. The dissection was continued down until circumferential isolation of femur was achieved. the femur was transected 2 cm and covered with surronding tissue. Soft tissue was then approximated with 2-0 vycril. Wound was irrigated. Wound was then packed with betadine kerlex. The patient tolerated the procedure well without any complications and was sent to the recovery area in stable condition.      Arley Peraza MD     1/25/2019

## 2019-01-25 NOTE — PROGRESS NOTES
[facility] 
[address] [facility], [address] [facility] [address] Dr. Danielle Turpin, Dr. Valentin Stevens, Dr. Chanelle Doe [phone] FAX# [phone] Progress note Patient: Louisa Govea MRN: [id number]  SSN: [id number] YOB: 1945  Age: 74 y.o. Sex: male Subjective: Louisa Govea is a 74 y.o. male who is being seen for ischemic left AKA wound. Patient is s/p left AKA due to severe PVD with occluded femoral artery. He developed ischemic changes on lateral and medial wounds without signs of infection. Past Medical History:  
Diagnosis Date  Anemia  Arthropathy  BPH (benign prostatic hypertrophy)  Diabetes mellitus ([address]  Difficulty urinating  DVT (deep venous thrombosis) ([address]  Elevated cholesterol  Esophageal reflux  GERD (gastroesophageal reflux disease)  Gout  Heartburn  Hypercholesteremia  Hyperlipidemia  Hypertension  Hypertrophy of prostate with urinary obstruction and other lower urinary tract symptoms (LUTS)  Hyponatremia  Loose, teeth  Nocturia  PAD (peripheral artery disease) ([address]  Peptic ulcer of stomach  Pneumonia  Prostate disease  Seizures ([address]  Unspecified epilepsy without mention of intractable epilepsy Past Surgical History:  
Procedure Laterality Date  HX CATARACT REMOVAL    
 HX COLONOSCOPY    
 HX CYST REMOVAL    
 HX OTHER SURGICAL  04/11/2008 - Dr. Ina Garcia  
 lower extremity bypass surgery  HX SMALL BOWEL RESECTION    
 VASCULAR SURGERY PROCEDURE UNLIST  12/2018 Left AKA Family History Problem Relation Age of Onset  Cancer Mother Social History Tobacco Use  Smoking status: Current Every Day Smoker Packs/day: 0.30 Types: Cigarettes  Smokeless tobacco: Never Used Substance Use Topics  Alcohol use:  No  
  
  
 
 No Known Allergies Review of Systems: A comprehensive review of systems was negative. Objective:  
 
Vitals:  
 01/24/19 1948 01/24/19 2137 01/25/19 0110 01/25/19 5167 BP: 120/69 104/66 126/63 147/67 Pulse: (!) 102 (!) 106 88 81 Resp: 18 18 20 18 Temp: (!) 101.4 °F (38.6 °C) 99.8 °F (37.7 °C) 98.8 °F (37.1 °C) 97.9 °F (36.6 °C) SpO2: 94% 94% 93% 97% Weight:      
Height:      
  
 
Physical Exam: 
GENERAL: alert, cooperative, no distress, appears stated age EYE: negative LYMPHATIC: Cervical, supraclavicular, and axillary nodes normal.  
THROAT & NECK: normal and no erythema or exudates noted. LUNG:  diminished breath sounds R base, L base HEART: regular rate and rhythm, S1, S2 normal, no murmur, click, rub or gallop ABDOMEN: soft, non-tender. Bowel sounds normal. No masses,  no organomegaly EXTREMITIES:  edema Left AKA wound with medial and lateral wound necrotic skin, no purulent drainage. SKIN: compromise ischemic skin of left AKA. NEUROLOGIC: AOx3. Gait normal. Reflexes and motor strength normal and symmetric. Cranial nerves 2-12 and sensation grossly intact. PSYCHIATRIC: non focal 
 
 
Assessment:  
 
Hospital Problems  Date Reviewed: 9/8/2016 Codes Class Noted POA * (Principal) Lower extremity ulceration, left, with fat layer exposed ([facility] ICD-10-CM: D02.885 ICD-9-CM: 707.10  1/24/2019 Unknown DM (diabetes mellitus) ([facility] ICD-10-CM: E11.9 ICD-9-CM: 250.00  12/22/2018 Yes HTN (hypertension) ICD-10-CM: I10 
ICD-9-CM: 401.9  12/22/2018 Yes PAD (peripheral artery disease) ([facility]) ICD-10-CM: I73.9 ICD-9-CM: 443.9  9/17/2012 Yes Plan:  
NPO/IV fluids To OR for left AKA wound debridement washout today. Continue all home medications. Signed By: Chinedu Diaz MD   
 January 25, 2019

## 2019-01-25 NOTE — PROGRESS NOTES
Problem: Falls - Risk of 
Goal: *Absence of Falls Document Lizzette Garcia Fall Risk and appropriate interventions in the flowsheet. Outcome: Progressing Towards Goal 
Fall Risk Interventions: 
Mobility Interventions: Patient to call before getting OOB Medication Interventions: Patient to call before getting OOB, Teach patient to arise slowly, Evaluate medications/consider consulting pharmacy Elimination Interventions: Call light in reach, Patient to call for help with toileting needs Problem: Pressure Injury - Risk of 
Goal: *Prevention of pressure injury Document Lior Scale and appropriate interventions in the flowsheet. Outcome: Progressing Towards Goal 
Pressure Injury Interventions: 
Sensory Interventions: Assess changes in LOC Activity Interventions: Increase time out of bed Mobility Interventions: HOB 30 degrees or less Nutrition Interventions: Document food/fluid/supplement intake Friction and Shear Interventions: HOB 30 degrees or less

## 2019-01-25 NOTE — CDMP QUERY
The medical record reflects the following Risk: pt with Left Aka  with  ischemic changes  sec to severe PVD, bmi 18.08, diabetic Clinical Indicators: bmi  18.08 Treatment:  monitor I/O, dietary consult Please clarify if this patient is being treated/managed for: 
 
=>Underweight with bmi 18.08,  
=>Cachectic 
=>Mild to moderate or Severe Malnutrition =>Other Explanation 
=>Unable to Determine (no explanation of clinical findings) Please clarify and document your clinical opinion in the progress notes and discharge summary including the definitive and/or presumptive diagnosis, (suspected or probable), related to the above clinical findings. Please include clinical findings supporting your diagnosis. If you DECLINE this query or would like to communicate with [facility], please utilize the \"[employer] message box\" at the TOP of the Progress Note on the right. Thank you, 
Travis Vaz RN/RADHA 
[phone]

## 2019-01-25 NOTE — PROGRESS NOTES
Chart reviewed, patient and Brother in law. Patient lives at [facility] and [address] [place] [address] term [facility]. He gets assistance with ADL. His Brother in [place] [phone]/ [phone] is his contact. I posted to NHR in cc link. Reason for Readmission: Lower extremity ulceration with fat layer exposed , left AKA wound debridement wash out   today RRAT Score and Risk Level:     22 Level of Readmission:   Level one Care Conference scheduled:    
    
Resources/supports as identified by patient/family:   He has the long term care, he has Brother in [place] [phone] Top Challenges facing patient (as identified by patient/family and CM): Finances/Medication cost?  He has [employer] [phone]    Option 7 Support system or lack thereof? Brother in law Living arrangements? [address] [facility] term [facility] Self-care/ADLs/Cognition? Needs assistance with ADL Current Advanced Directive/Advance Care Plan:   
        
Plan for utilizing home health:   May need RN  wound care and /or IV therapy Likelihood of additional readmission:   Mod/yellow Transition of Care Plan:    Based on readmission, the patient's previous Plan of Care 
 has been evaluated and/or modified. The current Transition of Care Plan is:     Back to long term care   , may need Home Health wound care and/ or IV therapy.

## 2019-01-25 NOTE — PROGRESS NOTES
conducted a pre-surgery visit with Candie Cabello, who is a 74 y. o.,male. The  provided the following Interventions: 
Initiated a relationship of care and support. Offered prayer and assurance of continued prayers on patient's behalf. Plan: 
Chaplains will continue to follow and will provide pastoral care on an as needed/requested basis.  recommends bedside caregivers page  on duty if patient shows signs of acute spiritual or emotional distress. Reiseñor 3 Board Certified [address] [place] [address] Care  
[phone] Progress Notes by Prashanth Herring MD at 03/17/17 10:52 AM     Author:  Prashanth Herring MD Service:  (none) Author Type:  Physician     Filed:  03/18/17 12:27 PM Encounter Date:  3/17/2017 Status:  Signed     :  Prashanth Herring MD (Physician)              SUBJECTIVE:  Heladio Santana is a 45 year old male who presents today for .[RP1.1T]  Sinus Problem[RP1.1M]   This is a[RP1.1T] new[RP1.1M] problem. The current episode started[RP1.1T] in the past 7 days[RP1.1M]. The problem has been[RP1.1T] gradually worsening[RP1.1M] since onset. There has been[RP1.1T] no fever[RP1.1M]. Associated symptoms include[RP1.1T] congestion[RP1.1M],[RP1.1T] headaches[RP1.1M] and[RP1.1T] sinus pressure[RP1.1M]. Past treatments include[RP1.1T] nothing[RP1.1M].       His past medical history is significant for:  There is no problem list on file for this patient.      Allergies: Penicillins    No current outpatient prescriptions on file.         Review of Systems   HENT: Positive for[RP1.1T] congestion[RP1.1M] and[RP1.1T] sinus pressure[RP1.1M].    Neurological: Positive for[RP1.1T] headaches[RP1.1M].[RP1.1T]   All other systems reviewed and are negative[RP1.1M].      OBJECTIVE:  BP (!) 134/97  Pulse 70  Temp 98 °F (36.7 °C) (Temporal)  Resp 18  SpO2 100%     Physical Exam   Constitutional: He appears well-developed and well-nourished. No distress.   HENT:   Right Ear: Tympanic membrane and external ear normal.   Left Ear: Tympanic membrane and external ear normal.   Nose: Mucosal edema and rhinorrhea present. Right sinus exhibits frontal sinus tenderness. Left sinus exhibits frontal sinus tenderness.   Eyes: Conjunctivae are normal.   Neck: Neck supple.   Cardiovascular: Normal rate, regular rhythm and normal heart sounds.    Pulmonary/Chest: Effort normal and breath sounds normal.   Lymphadenopathy:     He has no cervical adenopathy.[RP1.1T]   Nursing note[RP1.1M] and[RP1.1T] vitals[RP1.1M] reviewed.      ASSESSMENT:  Sinusitis-likely  bacterial      PLAN:  I had a discussion with patient regarding his/her condition.  Antibiotics as ordered.    Discussed typical course of illness which usually lasts 5-10 days.  Recommend close observation and symptomatic therapy.  Patient should increase oral fluid intake to maintain hydration and take Tylenol/Motrin for fever and body aches.  If symptoms are not improving or worsening in anyway patient should seek immediate reevaluation either here in [place] or with PCP.  If  necessary, patient should also consider going to ER.  Patient should specifically watch for symptoms of dehydration, increased lethargy,  persistent vomiting, chest tightness or shortness of breath.  Those symptoms should trigger immediate reevaluation.  Patient verbalized understanding.[RP1.1T]      Electronically Signed by:    Prashanth Herring MD , 3/17/2017[RP1.2T]        Revision History        User Key Date/Time User Provider Type Action    > RP1.2 03/18/17 12:27 PM Prashanth Herring MD Physician Sign     RP1.1 03/17/17 10:52 AM Prashanth Herring MD Physician     M - Manual, T - Template

## 2019-01-25 NOTE — PERIOP NOTES
7896  Assumed care of patient upon arrival to PACU. Patient drowsy, responsive to verbal stimuli, answering questions. Placed on monitor x3. VSS. 0852  BG 92. 
 
1020  TRANSFER - OUT REPORT: 
 
Verbal report given to LISA Euceda (name) on [employer]  being transferred to 2200 (unit) for routine post - op Report consisted of patients Situation, Background, Assessment and  
Recommendations(SBAR). Information from the following report(s) Procedure Summary, MAR, Recent Results and Cardiac Rhythm NSR was reviewed with the receiving nurse. Lines:  
Peripheral IV 01/24/19 Right Hand (Active) Site Assessment Clean, dry, & intact 1/25/2019  8:48 AM  
Phlebitis Assessment 0 1/25/2019  8:48 AM  
Infiltration Assessment 0 1/25/2019  8:48 AM  
Dressing Status Clean, dry, & intact 1/25/2019  8:48 AM  
Dressing Type Transparent;Tape 1/25/2019  8:48 AM  
Hub Color/Line Status Pink; Infusing 1/25/2019  8:48 AM  
Action Taken Open ports on tubing capped 1/25/2019  8:48 AM  
Alcohol Cap Used Yes 1/25/2019  8:48 AM  
  
 
Opportunity for questions and clarification was provided. Patient transported with: 
 O2 @ 2 liters Registered Nurse Tech

## 2019-01-25 NOTE — ANESTHESIA POSTPROCEDURE EVALUATION
Procedure(s): 
left above the knee amputation revision. Anesthesia Post Evaluation Multimodal analgesia: multimodal analgesia not used between 6 hours prior to anesthesia start to PACU discharge Patient location during evaluation: bedside Patient participation: complete - patient participated Level of consciousness: awake and alert Pain management: satisfactory to patient Airway patency: patent Anesthetic complications: no 
Cardiovascular status: acceptable Respiratory status: acceptable Hydration status: acceptable Post anesthesia nausea and vomiting:  none Visit Vitals /66 Pulse 84 Temp 36.7 °C (98.1 °F) Resp 12 Ht 5' 10\" (1.778 m) Wt 57.2 kg (126 lb) SpO2 97% BMI 18.08 kg/m²

## 2019-01-25 NOTE — CONSULTS
Internal Medicine Consult          Consult Requested By: Dr. Tomasa Reyes, vascular surgery    Subjective     HPI: Josefina Cardenas is a 74 y.o. male with a PMHx listed below who we were consulted for medical management while undergoing left AKA wound debridement washout today. He tolerated the procedure and doing well in PACU. He offered no complaints to me.     PMHx:  Past Medical History:   Diagnosis Date    Anemia     Arthropathy     BPH (benign prostatic hypertrophy)     Diabetes mellitus (HCC)     Difficulty urinating     DVT (deep venous thrombosis) (HCC)     Elevated cholesterol     Esophageal reflux     GERD (gastroesophageal reflux disease)     Gout     Heartburn     Hypercholesteremia     Hyperlipidemia     Hypertension     Hypertrophy of prostate with urinary obstruction and other lower urinary tract symptoms (LUTS)     Hyponatremia     Loose, teeth     Nocturia     PAD (peripheral artery disease) (HCC)     Peptic ulcer of stomach     Pneumonia     Prostate disease     Seizures (HCC)     Unspecified epilepsy without mention of intractable epilepsy        PSurgHx:  Past Surgical History:   Procedure Laterality Date    HX CATARACT REMOVAL      HX COLONOSCOPY      HX CYST REMOVAL      HX OTHER SURGICAL  04/11/2008 - Dr. Alison Camacho    lower extremity bypass surgery    HX SMALL BOWEL RESECTION      VASCULAR SURGERY PROCEDURE UNLIST  12/2018    Left AKA       SocialHx:  Social History     Socioeconomic History    Marital status: SINGLE     Spouse name: Not on file    Number of children: Not on file    Years of education: Not on file    Highest education level: Not on file   Social Needs    Financial resource strain: Not on file    Food insecurity - worry: Not on file    Food insecurity - inability: Not on file   [employer] needs - medical: Not on file   [employer] needs - non-medical: Not on file   Occupational History    Not on file   Tobacco Use    Smoking status: Current Every Day Smoker     Packs/day: 0.30     Types: Cigarettes    Smokeless tobacco: Never Used   Substance and Sexual Activity    Alcohol use: No    Drug use: No    Sexual activity: Not on file   Other Topics Concern    Not on file   Social History Narrative    Not on file       FamilyHx:  Family History   Problem Relation Age of Onset    Cancer Mother        Prior to Admission Medications   Prescriptions Last Dose Informant Patient Reported? Taking?   allopurinol (ZYLOPRIM) 100 mg tablet 1/23/2019 at Unknown time  Yes Yes   Sig: Take  by mouth daily. amLODIPine (NORVASC) 5 mg tablet 1/23/2019 at Unknown time  Yes Yes   Sig: Take 5 mg by mouth daily. atorvastatin (LIPITOR) 40 mg tablet 1/23/2019 at Unknown time  Yes Yes   Sig: Take  by mouth daily. calcium citrate-vitamin d3 (CITRACAL+D) 315-200 mg-unit tab 1/23/2019 at Unknown time  Yes Yes   Sig: Take 1 Tab by mouth daily (with breakfast). capsicum oleoresin 0.025 % topical cream 1/24/2019  Yes Yes   Sig: Apply  to affected area three (3) times daily. carvedilol (COREG) 25 mg tablet 1/23/2019 at Unknown time  Yes Yes   Sig: Take 25 mg by mouth two (2) times daily (with meals). cephALEXin (KEFLEX) 500 mg capsule 1/23/2019 at Unknown time  Yes Yes   Sig: Take 500 mg by mouth four (4) times daily. clopidogrel (PLAVIX) 75 mg tab 12/26/2018  Yes No   Sig: Take  by mouth.   colchicine (COLCRYS) 0.6 mg tablet 1/23/2019 at Unknown time  Yes Yes   Sig: Take 0.6 mg by mouth daily. docusate sodium (COLACE) 50 mg capsule 1/24/2019  Yes Yes   Sig: Take 50 mg by mouth two (2) times a day. ergocalciferol (VITAMIN D2) 50,000 unit capsule 1/24/2019  Yes Yes   Sig: Take 50,000 Units by mouth every seven (7) days. ferrous sulfate (FEOSOL) 325 mg (65 mg iron) tablet 1/23/2019 at Unknown time  Yes Yes   Sig: Take  by mouth Daily (before breakfast).      furosemide (LASIX) 20 mg tablet 1/23/2019 at Unknown time  Yes Yes   Sig: Take  by mouth daily. gabapentin (NEURONTIN) 400 mg capsule 1/23/2019 at Unknown time  Yes Yes   Sig: Take 400 mg by mouth daily. hydrALAZINE (APRESOLINE) 25 mg tablet 1/23/2019 at Unknown time  Yes Yes   Sig: Take 25 mg by mouth three (3) times daily. levETIRAcetam (KEPPRA) 500 mg tablet 1/23/2019 at Unknown time  Yes Yes   Sig: Take  by mouth two (2) times a day. memantine (NAMENDA) 10 mg tablet 1/23/2019 at Unknown time  Yes Yes   Sig: Take  by mouth daily. oxyCODONE-acetaminophen (PERCOCET) 5-325 mg per tablet 1/23/2019 at Unknown time  No Yes   Sig: Take 1 Tab by mouth every six (6) hours as needed for Pain. Max Daily Amount: 4 Tabs. phenytoin ER (DILANTIN) 100 mg ER capsule 1/23/2019 at Unknown time  Yes Yes   Sig: Take 100 mg by mouth daily. potassium citrate (UROCIT-K10) 10 mEq (1,080 mg) TbER 1/23/2019 at Unknown time  Yes Yes   Sig: Take  by mouth. ranitidine (ZANTAC) 150 mg tablet 1/23/2019 at Unknown time  Yes Yes   Sig: Take 150 mg by mouth two (2) times a day. tamsulosin (FLOMAX) 0.4 mg capsule 1/23/2019 at Unknown time  No Yes   Sig: Take 1 Cap by mouth daily. umeclidinium (INCRUSE ELLIPTA) 62.5 mcg/actuation inhaler Not Taking at Unknown time  Yes No   Sig: Take 1 Puff by inhalation daily.       Facility-Administered Medications: None       Review of Systems:  CONST: Fever, weight loss, fatigue or chills  HEENT: Recent changes in vision, vertigo, epistaxis, dysphagia and hoarseness  CV: Chest pain, palpitations, edema and varicosities  RESP: Cough, shortness of breath, wheezing, hemoptysis, snoring and reactive airway disease  GI: Nausea, vomiting, abdominal pain, change in bowel habits, hematochezia, melena, and GERD   : Hematuria, dysuria, frequency, urgency, nocturia and stress urinary incontinence   MS: Weakness, joint pain and arthritis  ENDO: Polyuria, polydipsia, polyphagia, poor wound healing, heat intolerance, cold intolerance  LYMPH/HEME: Anemia, bruising and history of blood transfusions  INTEG: Dermatitis, abnormal moles  NEURO: Dizziness, headache, fainting, seizures and stroke  PSYCH: Anxiety and depression        Objective      Visit Vitals  /70 (BP 1 Location: Right arm)   Pulse 84   Temp 98 °F (36.7 °C)   Resp 14   Ht 5' 10\" (1.778 m)   Wt 57.2 kg (126 lb)   SpO2 97%   BMI 18.08 kg/m²       Physical Exam:  General Appearance: NAD, conversant  HENT: normocephalic/atraumatic, moist mucus membranes  Lungs: CTA with normal respiratory effort  CV: RRR, no m/r/g  Abdomen: soft, non-tender, normal bowel sounds  Extremities: no cyanosis, L AKA w/ dressings in place C/D/I  Neuro: moves all extremities, no focal deficits  Psych: appropriate affect, alert and oriented to person, place and time    Laboratory Studies:  BMP:   Lab Results   Component Value Date/Time     01/25/2019 04:00 AM    K 3.9 01/25/2019 04:00 AM     01/25/2019 04:00 AM    CO2 22 01/25/2019 04:00 AM    AGAP 9 01/25/2019 04:00 AM    GLU 88 01/25/2019 04:00 AM    BUN 22 (H) 01/25/2019 04:00 AM    CREA 1.30 01/25/2019 04:00 AM    GFRAA >60 01/25/2019 04:00 AM    GFRNA 54 (L) 01/25/2019 04:00 AM     CBC:   Lab Results   Component Value Date/Time    WBC 5.9 01/25/2019 04:00 AM    HGB 8.6 (L) 01/25/2019 04:00 AM    HCT 26.7 (L) 01/25/2019 04:00 AM     01/25/2019 04:00 AM       Imaging Reviewed:  No results found.       Assessment/Plan     Active Hospital Problems    Diagnosis Date Noted    Anemia 01/25/2019    Wound infection 01/25/2019    Lower extremity ulceration, left, with fat layer exposed ([address] 01/24/2019    DM (diabetes mellitus) ([address] 12/22/2018    HTN (hypertension) 12/22/2018    PAD (peripheral artery disease) ([facility] 09/17/2012     - Diet and mobilization per primary team  - Pain control PRN  - PT/OT  - Cont doxy x 7 days  - SSI and accuchecks  - Cont acceptable home medications for chronic conditions   - DVT protocol    I reviewed all available labs and imaging that were available prior to my encounter. We appreciate the consultation for medical management and appreciate being able to be involved with their care during hospitalization.       Dimple Kayser, DO  Internal Medicine, Hospitalist  Pager: [phone] [facility]

## 2019-01-26 PROCEDURE — 97530 THERAPEUTIC ACTIVITIES: CPT

## 2019-01-26 PROCEDURE — 97162 PT EVAL MOD COMPLEX 30 MIN: CPT

## 2019-01-26 PROCEDURE — 65270000029 HC RM PRIVATE

## 2019-01-26 PROCEDURE — 74011250636 HC RX REV CODE- 250/636: Performed by: SURGERY

## 2019-01-26 PROCEDURE — 74011250637 HC RX REV CODE- 250/637: Performed by: SURGERY

## 2019-01-26 PROCEDURE — 74011250637 HC RX REV CODE- 250/637: Performed by: HOSPITALIST

## 2019-01-26 RX ADMIN — Medication 10 ML: at 22:11

## 2019-01-26 RX ADMIN — HEPARIN SODIUM 5000 UNITS: 5000 INJECTION INTRAVENOUS; SUBCUTANEOUS at 00:11

## 2019-01-26 RX ADMIN — LEVETIRACETAM 750 MG: 500 TABLET, FILM COATED ORAL at 12:04

## 2019-01-26 RX ADMIN — FAMOTIDINE 20 MG: 20 TABLET ORAL at 12:04

## 2019-01-26 RX ADMIN — DOXYCYCLINE 100 MG: 100 CAPSULE ORAL at 12:03

## 2019-01-26 RX ADMIN — MEMANTINE HYDROCHLORIDE 10 MG: 10 TABLET ORAL at 12:04

## 2019-01-26 RX ADMIN — Medication 10 ML: at 18:34

## 2019-01-26 RX ADMIN — TAMSULOSIN HYDROCHLORIDE 0.4 MG: 0.4 CAPSULE ORAL at 12:05

## 2019-01-26 RX ADMIN — COLCHICINE 0.6 MG: 0.6 TABLET, FILM COATED ORAL at 12:04

## 2019-01-26 RX ADMIN — Medication 10 ML: at 06:51

## 2019-01-26 RX ADMIN — DOXYCYCLINE 100 MG: 100 CAPSULE ORAL at 21:30

## 2019-01-26 RX ADMIN — GABAPENTIN 400 MG: 400 CAPSULE ORAL at 12:06

## 2019-01-26 RX ADMIN — CLOPIDOGREL BISULFATE 75 MG: 75 TABLET, FILM COATED ORAL at 12:04

## 2019-01-26 RX ADMIN — OXYCODONE AND ACETAMINOPHEN 1 TABLET: 5; 325 TABLET ORAL at 04:00

## 2019-01-26 RX ADMIN — FAMOTIDINE 20 MG: 20 TABLET ORAL at 18:33

## 2019-01-26 RX ADMIN — ALLOPURINOL 100 MG: 100 TABLET ORAL at 12:06

## 2019-01-26 RX ADMIN — FUROSEMIDE 20 MG: 20 TABLET ORAL at 12:04

## 2019-01-26 RX ADMIN — CEFAZOLIN SODIUM 2 G: 2 SOLUTION INTRAVENOUS at 00:05

## 2019-01-26 RX ADMIN — CARVEDILOL 25 MG: 25 TABLET, FILM COATED ORAL at 12:05

## 2019-01-26 RX ADMIN — GABAPENTIN 400 MG: 400 CAPSULE ORAL at 17:24

## 2019-01-26 RX ADMIN — ACETAMINOPHEN 650 MG: 325 TABLET, FILM COATED ORAL at 17:24

## 2019-01-26 RX ADMIN — TIOTROPIUM BROMIDE 18 MCG: 18 CAPSULE ORAL; RESPIRATORY (INHALATION) at 13:28

## 2019-01-26 RX ADMIN — OLMESARTAN MEDOXOMIL 40 MG: 20 TABLET, COATED ORAL at 12:03

## 2019-01-26 RX ADMIN — OXYCODONE AND ACETAMINOPHEN 1 TABLET: 5; 325 TABLET ORAL at 13:26

## 2019-01-26 RX ADMIN — HEPARIN SODIUM 5000 UNITS: 5000 INJECTION INTRAVENOUS; SUBCUTANEOUS at 12:51

## 2019-01-26 RX ADMIN — ATORVASTATIN CALCIUM 40 MG: 10 TABLET, FILM COATED ORAL at 21:30

## 2019-01-26 RX ADMIN — AMLODIPINE BESYLATE 5 MG: 5 TABLET ORAL at 12:05

## 2019-01-26 RX ADMIN — GABAPENTIN 400 MG: 400 CAPSULE ORAL at 21:30

## 2019-01-26 RX ADMIN — DOCUSATE SODIUM 100 MG: 100 CAPSULE, LIQUID FILLED ORAL at 12:05

## 2019-01-26 RX ADMIN — LEVETIRACETAM 750 MG: 500 TABLET, FILM COATED ORAL at 18:33

## 2019-01-26 RX ADMIN — HEPARIN SODIUM 5000 UNITS: 5000 INJECTION INTRAVENOUS; SUBCUTANEOUS at 21:30

## 2019-01-26 NOTE — PROGRESS NOTES
Problem: Mobility Impaired (Adult and Pediatric) Goal: *Acute Goals and Plan of Care (Insert Text) Physical Therapy Goals Initiated 1/26/2019 and to be accomplished within 7 day(s) 1. Patient will move from supine to sit and sit to supine , scoot up and down and roll side to side in bed with minimal assistance/contact guard assist.    
2.  Patient will transfer from bed to chair and chair to bed with minimal assistance/contact guard assist using the least restrictive device. 3.  Patient will perform sit to stand with minimal assistance/contact guard assist. 
4.  Patient will ambulate with minimal assistance/contact guard assist for >/= 25 feet with the least restrictive device. 5.  Patient will demonstrate independence with performance of LE exercises. physical Therapy EVALUATION Patient: Ada Ortiz (88 y.o. male) Date: 1/26/2019 Primary Diagnosis: i73.9 Lower extremity ulceration, left, with fat layer exposed ([address] Procedure(s) (LRB): 
left above the knee amputation revision (Left) 1 Day Post-Op Precautions: Fall PLOF: According to patient, he was able to get in and OOB and walk by himself but unable to tell whether he used an assistive device. ASSESSMENT : 
 Patient requires between moderate assistance  and maximal assistance for bed mobility, transfers. Unable to assess ambulation at this time. Will benefit from skilled PT intervention to increase overall functional mobility independence and safety post-op. Patient presents with deficits in:  
Bed Mobility, Transfers, Gait, Strength, Balance and Home Exercise Program 
 
Patient will benefit from skilled intervention to address the above impairments. Patients rehabilitation potential is considered to be Good Factors which may influence rehabilitation potential include:  
[]         None noted 
[x]         Mental ability/status [x]         Medical condition 
[]         Home/family situation and support systems []         Safety awareness [x]         Pain tolerance/management 
[]         Other:  
 
Recommendations for nursing:  
Written on communication board: Sitting edge of bed with assist of 1 Verbally communicated to: nurse Bard Abraham PLAN : 
Recommendations and Planned Interventions: 
[x]           Bed Mobility Training             []    Neuromuscular Re-Education 
[x]           Transfer Training                   []    Orthotic/Prosthetic Training 
[x]           Gait Training                          []    Modalities [x]           Therapeutic Exercises          []    Edema Management/Control 
[]           Therapeutic Activities            [x]    Patient and Family Training/Education* [x]           Other (comment): Plan of care, bed mobility, transfer Frequency/Duration: Patient will be followed by physical therapy 1 time per day/3-5 days per week to address goals. Discharge Recommendations: Brennan Weems Further Equipment Recommendations for Discharge: to be determined at the next level of care SUBJECTIVE:  
Patient stated I'm at [facility]. It's 2009. I already had something for pain.  OBJECTIVE DATA SUMMARY:  
 
Past Medical History:  
Diagnosis Date  Anemia  Arthropathy  BPH (benign prostatic hypertrophy)  Diabetes mellitus ([address]  Difficulty urinating  DVT (deep venous thrombosis) ([address]  Elevated cholesterol  Esophageal reflux  GERD (gastroesophageal reflux disease)  Gout  Heartburn  Hypercholesteremia  Hyperlipidemia  Hypertension  Hypertrophy of prostate with urinary obstruction and other lower urinary tract symptoms (LUTS)  Hyponatremia  Loose, teeth  Nocturia  PAD (peripheral artery disease) ([address]  Peptic ulcer of stomach  Pneumonia  Prostate disease  Seizures ([address]  Unspecified epilepsy without mention of intractable epilepsy Past Surgical History:  
Procedure Laterality Date  HX CATARACT REMOVAL    
 HX COLONOSCOPY    
 HX CYST REMOVAL    
 HX OTHER SURGICAL  04/11/2008 - Dr. Katina Woods  
 lower extremity bypass surgery  HX SMALL BOWEL RESECTION    
 VASCULAR SURGERY PROCEDURE UNLIST  12/2018 Left AKA Barriers to Learning/Limitations: yes;  altered mental status (i.e.Sedation, Confusion) Compensate with: visual, verbal, tactile, kinesthetic cues/model G CODE:Mobility [id number] Current  CL= 60-79%   Goal  CI= 1-19%. The severity rating is based on the [facility] Sitting Balance Scale Eval Complexity: History: MEDIUM  Complexity : 1-2 comorbidities / personal factors will impact the outcome/ POC Exam:MEDIUM Complexity : 3 Standardized tests and measures addressing body structure, function, activity limitation and / or participation in recreation  Presentation: MEDIUM Complexity : Evolving with changing characteristics  Clinical Decision Making:Medium Complexity [facility] Sitting Balance Scale Overall Complexity:MEDIUM [facility] Sitting Balance Scale 
0: Pt performs 25% or less of sitting activity (Max assist) CN, 100% impaired. 1: Pt supports self with upper extremities but requires therapist assistance. Pt performs 25-50% of effort (Mod assist) CM, 80% to <100% impaired. 1+: Pt supports self with upper extremities but requires therapist assistance. Pt performs >50% effort. (Min assist). CL, 60% to <80% impaired. 2: Pt supports self independently with both upper extremities. CL, 60% to <80% impaired. 2+: Pt support self independently with 1 upper extremity. CK, 40% to <60% impaired. 3: Pt sits without upper extremity support for up to 30 seconds. CK, 40% to <60% impaired. 3+: Pt sits without upper extremity support for 30 seconds or greater. CJ, 20% to <40% impaired. 4: Pt moves and returns trunkal midpoint 1-2 inches in one plane. CJ, 20% to <40% impaired. 4+: Pt moves and returns trunkal midpoint 1-2 inches in multiple planes. CI, 1% to <20% impaired. 5: Pt moves and returns trunkal midpoint in all planes greater than 2 inches. CH, 0% impaired. Prior Level of Function/Home Situation: According to patient, he was able to get in and OOB and walk by himself but unable to tell whether he used an assistive device. Home Situation Home Environment: Long term care One/Two Story Residence: One story Living Alone: No 
Support Systems: Skilled nursing facility Patient Expects to be Discharged to[de-identified] Skilled nursing facility Current DME Used/Available at Home: WheelchairCritical Behavior: 
Neurologic State: Drowsy Orientation Level: Oriented to person;Oriented to place Cognition: Follows commands Psychosocial 
Patient Behaviors: Calm; Cooperative Purposeful Interaction: Yes   
Strength:   
Strength: Generally decreased, functional(RLE and Left AKA) Tone & Sensation:  
Tone: Normal(both LE) Range Of Motion: 
AROM: Generally decreased, functional(RLE and Left AKA) Functional Mobility: 
Bed Mobility: 
Supine to Sit: Moderate assistance;Maximum assistance;Assist x1 Sit to Supine: Moderate assistance;Maximum assistance;Assist x1 Transfers: 
Sit to Stand: (attempted, unable to clear bottom off bed with max Assist 1) Balance:  
Sitting: With support Sitting - Static: Fair (occasional); Occassional(Minus/Poor +) Sitting - Dynamic: Poor (constant support) Standing: (Unable to assess)Ambulation/Gait Training: 
            Unable to assess at this time Pain: 
Pre treatment pain level:  5, Left stumpPost treatment pain level:   8, Left stump - Nurse Bobbi Sanders made aware Pain Scale 1: Visual 
Pain Intensity 1: 0 Pain Location 1: Leg 
Pain Orientation 1: Left Pain Description 1: Aching Pain Intervention(s) 1: Medication (see MAR) Activity Tolerance: 
Poor +, c/o increased pain sitting edge of bed Please refer to the flowsheet for vital signs taken during this treatment. After treatment: []         Patient left in no apparent distress sitting up in chair 
[x]         Patient left in no apparent distress in bed 
[x]         Call bell left within reach [x]         Nursing notified 
[]         Caregiver present 
[]         Bed alarm activated COMMUNICATION/EDUCATION:  
[x]         Fall prevention education was provided and the patient/caregiver indicated understanding. [x]         Patient/family have participated as able in goal setting and plan of care. [x]         Patient/family agree to work toward stated goals and plan of care. []         Patient understands intent and goals of therapy, but is neutral about his/her participation. []         Patient is unable to participate in goal setting and plan of care. Thank you for this referral. 
Dat Mayorga, PT Time Calculation: 25 mins

## 2019-01-26 NOTE — PROGRESS NOTES
Physical Exam  
Skin:  
 
  
  
Assumed care of patient at 1924 report received from Beau Woods RN. All safety precautions in place - safety maintained. Received pt in bed awake alert & oriented, drinking coffee and no apparent distress. He denies pain. Left AKA dressing ace wrap noted old drainage. Bland catheter in place patient said it was placed from nursing facility. 2052 -  PRN Percocet given for pain. Pain rate 3/10.  
2122 -  Pt verbalized some relief of pain. 2326 -  Notified Dr. Pat Vela regarding patient bland catheter already had it prior to admission. MD did order bland care but Not order to insert bland because patient had bland prior to admission. No order for urinalysis and no order for urine culture. 0200 - Pt asleep. 
0400 - PRN Percocet given for pain. Left AKA dressing with old drainage. 0840 - Bedside and Verbal shift change report given to Cait Connell RN (oncoming nurse) by Valentin Draper RN BSN (offgoing nurse). Report given with SBAR, Kardex, Intake/Output, MAR and Recent Results.

## 2019-01-26 NOTE — PROGRESS NOTES
Vascular Surgery Progress Note Admit Date: 2019 POD 1 Day Post-Op Procedure/Surgery:  Procedure(s): 
left above the knee amputation revision Subjective: Pt with moderate pain L AKA revision. Objective:  
 
Visit Vitals /65 (BP 1 Location: Right arm, BP Patient Position: At rest) Pulse 94 Temp 98.4 °F (36.9 °C) Resp 14 Ht 5' 10\" (1.778 m) Wt 57.2 kg (126 lb) SpO2 100% BMI 18.08 kg/m² Temp (24hrs), Av.8 °F (37.1 °C), Min:98.4 °F (36.9 °C), Max:99.7 °F (37.6 °C) Physical Exam: 
GEN: A&Ox3, NAD, comfortable. HEENT:PERRL EOMI, non icteric, moist membranes. NECK: no JVD, supple LUNG: clear b/l and unlabored breathing HEART: regular ABD: soft, NT,NABS  
EXT: LAKA dressing lateral wound with sanguinous drainage mostly dried- wound 3cm longx 2cm wide x 3cm deep - no bleeding, no erythema, Medial open wound ~1.5cm wide x 2cm deep - both wounds with wet to dry saline dressing applied. Tolerated well, tissue appears viable. Able to hold up AKA for dressing change. Assessment:  
 
Principal Problem: 
  Lower extremity ulceration, left, with fat layer exposed ([address] (2019) Active Problems: 
  PAD (peripheral artery disease) ([address] (2012) DM (diabetes mellitus) ([address] (2018) HTN (hypertension) (2018) Anemia (2019) Wound infection (2019) POD#1 medial lateral L AKA wound debridement - no exposed femur. Plan/Recommendations/Medical Decision Making:  
Dressing changed with wet to dry saline dressing change. Wound care consult for aquacel Ag dressing change every other day. Case management consult for luly. Cathleen Ayala [address] [place], [address] [place] [address] [facility] - [phone] 2019 10:57 AM

## 2019-01-26 NOTE — PROGRESS NOTES
Problem: Falls - Risk of 
Goal: *Absence of Falls Document Thelma Gonzáles Fall Risk and appropriate interventions in the flowsheet. Outcome: Progressing Towards Goal 
Fall Risk Interventions: 
Mobility Interventions: Patient to call before getting OOB, Communicate number of staff needed for ambulation/transfer, PT Consult for assist device competence Mentation Interventions: Adequate sleep, hydration, pain control, Door open when patient unattended, More frequent rounding Medication Interventions: Evaluate medications/consider consulting pharmacy, Patient to call before getting OOB, Teach patient to arise slowly Elimination Interventions: Call light in reach, Patient to call for help with toileting needs, Toileting schedule/hourly rounds, Urinal in reach History of Falls Interventions: Door open when patient unattended, Evaluate medications/consider consulting pharmacy, Investigate reason for fall Problem: Pressure Injury - Risk of 
Goal: *Prevention of pressure injury Document Lior Scale and appropriate interventions in the flowsheet. Outcome: Progressing Towards Goal 
Pressure Injury Interventions: 
Sensory Interventions: Assess changes in LOC, Check visual cues for pain, Discuss PT/OT consult with provider, Maintain/enhance activity level Activity Interventions: Pressure redistribution bed/mattress(bed type), PT/OT evaluation Mobility Interventions: HOB 30 degrees or less, Pressure redistribution bed/mattress (bed type), PT/OT evaluation, Turn and reposition approx. every two hours(pillow and wedges) Nutrition Interventions: Document food/fluid/supplement intake Friction and Shear Interventions: HOB 30 degrees or less, Transferring/repositioning devices

## 2019-01-26 NOTE — PROGRESS NOTES
Internal Medicine Progress Note Patient's Name: Justin Fitting Admit Date: 1/24/2019 Length of Stay: 2 Assessment/Plan Active Hospital Problems Diagnosis Date Noted  Anemia 01/25/2019  Wound infection 01/25/2019  Lower extremity ulceration, left, with fat layer exposed ([facility] 01/24/2019  DM (diabetes mellitus) ([facility] 12/22/2018  
 HTN (hypertension) 12/22/2018  PAD (peripheral artery disease) ([facility] 09/17/2012  
 
- Diet and mobilization per primary team 
- Pain control PRN 
- PT/OT 
- Cont doxy (2/7) - BP in good range 
- Diabetic diet 
- Cont acceptable home medications for chronic conditions  
- DVT protocol I have personally reviewed all pertinent labs and films that have officially resulted over the last 24 hours. I have personally checked for all pending labs that are awaiting final results. Subjective Pt s/e @ bedside No major events overnight Pain tolerable Afebrile overnight Denies CP or SOB Objective Visit Vitals /71 Pulse 90 Temp 98.4 °F (36.9 °C) Resp 14 Ht 5' 10\" (1.778 m) Wt 57.2 kg (126 lb) SpO2 98% BMI 18.08 kg/m² Physical Exam: 
General Appearance: NAD, conversant Lungs: CTA with normal respiratory effort CV: RRR, no m/r/g Abdomen: soft, non-tender, normal bowel sounds Extremities: no cyanosis, L AKA in dressing C/D/I Neuro: No focal deficits, motor/sensory intact Lab/Data Reviewed: 
BMP: No results found for: NA, K, CL, CO2, AGAP, GLU, BUN, CREA, GFRAA, GFRNA 
CBC: No results found for: WBC, HGB, HGBEXT, HCT, HCTEXT, PLT, PLTEXT, HGBEXT, HCTEXT, PLTEXT Imaging Reviewed: 
No results found. Medications Reviewed: 
Current Facility-Administered Medications Medication Dose Route Frequency  doxycycline (MONODOX) capsule 100 mg  100 mg Oral Q12H  clopidogrel (PLAVIX) tablet 75 mg  75 mg Oral DAILY  carvedilol (COREG) tablet 25 mg  25 mg Oral BID WITH MEALS  
  furosemide (LASIX) tablet 20 mg  20 mg Oral DAILY  gabapentin (NEURONTIN) capsule 400 mg  400 mg Oral TID  memantine (NAMENDA) tablet 10 mg  10 mg Oral DAILY  tamsulosin (FLOMAX) capsule 0.4 mg  0.4 mg Oral DAILY  docusate sodium (COLACE) capsule 100 mg  100 mg Oral DAILY  allopurinol (ZYLOPRIM) tablet 100 mg  100 mg Oral DAILY  atorvastatin (LIPITOR) tablet 40 mg  40 mg Oral QHS  oxyCODONE-acetaminophen (PERCOCET) 5-325 mg per tablet 1 Tab  1 Tab Oral Q4H PRN  
 levETIRAcetam (KEPPRA) tablet 750 mg  750 mg Oral BID  tiotropium (SPIRIVA) inhalation capsule 18 mcg  1 Cap Inhalation DAILY  albuterol (PROVENTIL VENTOLIN) nebulizer solution 2.5 mg  2.5 mg Nebulization Q6H PRN  
 famotidine (PEPCID) tablet 20 mg  20 mg Oral BID  colchicine tablet 0.6 mg  0.6 mg Oral DAILY  olmesartan (BENICAR) tablet 40 mg  40 mg Oral DAILY  sodium chloride (NS) flush 5-40 mL  5-40 mL IntraVENous Q8H  
 sodium chloride (NS) flush 5-40 mL  5-40 mL IntraVENous PRN  
 heparin (porcine) injection 5,000 Units  5,000 Units SubCUTAneous Q8H  
 amLODIPine (NORVASC) tablet 5 mg  5 mg Oral DAILY  acetaminophen (TYLENOL) tablet 650 mg  650 mg Oral Q6H PRN Tierra Vaz DO Internal Medicine, Hospitalist 
Pager: [phone] [facility]

## 2019-01-27 LAB
ANION GAP SERPL CALC-SCNC: 6 MMOL/L (ref 3–18)
BUN SERPL-MCNC: 23 MG/DL (ref 7–18)
BUN/CREAT SERPL: 22 (ref 12–20)
CALCIUM SERPL-MCNC: 8 MG/DL (ref 8.5–10.1)
CHLORIDE SERPL-SCNC: 105 MMOL/L (ref 100–108)
CO2 SERPL-SCNC: 27 MMOL/L (ref 21–32)
CREAT SERPL-MCNC: 1.03 MG/DL (ref 0.6–1.3)
ERYTHROCYTE [DISTWIDTH] IN BLOOD BY AUTOMATED COUNT: 15 % (ref 11.6–14.5)
GLUCOSE SERPL-MCNC: 103 MG/DL (ref 74–99)
HCT VFR BLD AUTO: 24.7 % (ref 36–48)
HGB BLD-MCNC: 7.8 G/DL (ref 13–16)
MCH RBC QN AUTO: 29.8 PG (ref 24–34)
MCHC RBC AUTO-ENTMCNC: 31.6 G/DL (ref 31–37)
MCV RBC AUTO: 94.3 FL (ref 74–97)
PLATELET # BLD AUTO: 212 K/UL (ref 135–420)
PMV BLD AUTO: 9.9 FL (ref 9.2–11.8)
POTASSIUM SERPL-SCNC: 3.9 MMOL/L (ref 3.5–5.5)
RBC # BLD AUTO: 2.62 M/UL (ref 4.7–5.5)
SODIUM SERPL-SCNC: 138 MMOL/L (ref 136–145)
WBC # BLD AUTO: 6.5 K/UL (ref 4.6–13.2)

## 2019-01-27 PROCEDURE — 74011250636 HC RX REV CODE- 250/636: Performed by: SURGERY

## 2019-01-27 PROCEDURE — 74011250637 HC RX REV CODE- 250/637: Performed by: SURGERY

## 2019-01-27 PROCEDURE — 85027 COMPLETE CBC AUTOMATED: CPT

## 2019-01-27 PROCEDURE — 74011250637 HC RX REV CODE- 250/637: Performed by: HOSPITALIST

## 2019-01-27 PROCEDURE — 65270000029 HC RM PRIVATE

## 2019-01-27 PROCEDURE — 80048 BASIC METABOLIC PNL TOTAL CA: CPT

## 2019-01-27 PROCEDURE — 36415 COLL VENOUS BLD VENIPUNCTURE: CPT

## 2019-01-27 RX ADMIN — DOCUSATE SODIUM 100 MG: 100 CAPSULE, LIQUID FILLED ORAL at 09:59

## 2019-01-27 RX ADMIN — LEVETIRACETAM 750 MG: 500 TABLET, FILM COATED ORAL at 18:27

## 2019-01-27 RX ADMIN — HEPARIN SODIUM 5000 UNITS: 5000 INJECTION INTRAVENOUS; SUBCUTANEOUS at 22:14

## 2019-01-27 RX ADMIN — ATORVASTATIN CALCIUM 40 MG: 10 TABLET, FILM COATED ORAL at 22:13

## 2019-01-27 RX ADMIN — TAMSULOSIN HYDROCHLORIDE 0.4 MG: 0.4 CAPSULE ORAL at 09:59

## 2019-01-27 RX ADMIN — Medication 10 ML: at 15:44

## 2019-01-27 RX ADMIN — GABAPENTIN 400 MG: 400 CAPSULE ORAL at 09:59

## 2019-01-27 RX ADMIN — LEVETIRACETAM 750 MG: 500 TABLET, FILM COATED ORAL at 09:57

## 2019-01-27 RX ADMIN — FAMOTIDINE 20 MG: 20 TABLET ORAL at 09:59

## 2019-01-27 RX ADMIN — DOXYCYCLINE 100 MG: 100 CAPSULE ORAL at 22:14

## 2019-01-27 RX ADMIN — FUROSEMIDE 20 MG: 20 TABLET ORAL at 09:56

## 2019-01-27 RX ADMIN — GABAPENTIN 400 MG: 400 CAPSULE ORAL at 22:13

## 2019-01-27 RX ADMIN — CLOPIDOGREL BISULFATE 75 MG: 75 TABLET, FILM COATED ORAL at 09:58

## 2019-01-27 RX ADMIN — TIOTROPIUM BROMIDE 18 MCG: 18 CAPSULE ORAL; RESPIRATORY (INHALATION) at 10:00

## 2019-01-27 RX ADMIN — COLCHICINE 0.6 MG: 0.6 TABLET, FILM COATED ORAL at 10:00

## 2019-01-27 RX ADMIN — GABAPENTIN 400 MG: 400 CAPSULE ORAL at 15:43

## 2019-01-27 RX ADMIN — OLMESARTAN MEDOXOMIL 40 MG: 20 TABLET, COATED ORAL at 09:58

## 2019-01-27 RX ADMIN — HEPARIN SODIUM 5000 UNITS: 5000 INJECTION INTRAVENOUS; SUBCUTANEOUS at 05:50

## 2019-01-27 RX ADMIN — FAMOTIDINE 20 MG: 20 TABLET ORAL at 18:27

## 2019-01-27 RX ADMIN — Medication 10 ML: at 22:15

## 2019-01-27 RX ADMIN — CARVEDILOL 25 MG: 25 TABLET, FILM COATED ORAL at 09:58

## 2019-01-27 RX ADMIN — AMLODIPINE BESYLATE 5 MG: 5 TABLET ORAL at 09:59

## 2019-01-27 RX ADMIN — CARVEDILOL 25 MG: 25 TABLET, FILM COATED ORAL at 18:27

## 2019-01-27 RX ADMIN — OXYCODONE AND ACETAMINOPHEN 1 TABLET: 5; 325 TABLET ORAL at 15:43

## 2019-01-27 RX ADMIN — DOXYCYCLINE 100 MG: 100 CAPSULE ORAL at 09:58

## 2019-01-27 RX ADMIN — HEPARIN SODIUM 5000 UNITS: 5000 INJECTION INTRAVENOUS; SUBCUTANEOUS at 12:24

## 2019-01-27 RX ADMIN — Medication 10 ML: at 05:51

## 2019-01-27 RX ADMIN — ALLOPURINOL 100 MG: 100 TABLET ORAL at 09:59

## 2019-01-27 RX ADMIN — MEMANTINE HYDROCHLORIDE 10 MG: 10 TABLET ORAL at 09:56

## 2019-01-27 NOTE — PROGRESS NOTES
Vascular Surgery Progress Note Admit Date: 2019 POD 2 Days Post-Op Procedure/Surgery:  Procedure(s): 
left above the knee amputation revision Subjective: L AKA revision/debridement some improvement in pain. Objective:  
 
Visit Vitals /56 (BP 1 Location: Right arm, BP Patient Position: At rest) Pulse 85 Temp 98.5 °F (36.9 °C) Resp 16 Ht 5' 10\" (1.778 m) Wt 57.2 kg (126 lb) SpO2 100% BMI 18.08 kg/m² Temp (24hrs), Av.7 °F (37.1 °C), Min:97.9 °F (36.6 °C), Max:99.8 °F (37.7 °C) Physical Exam: 
GEN: A&Ox3, NAD, comfortable. HEENT:PERRL EOMI, non icteric, moist membranes. NECK: no JVD, supple LUNG: clear b/l and unlabored breathing HEART: regular ABD: soft, NT,NABS  
EXT: LAKA dressing lateral /medial wounds no drainage,  
 
Labs:  
Recent Results (from the past 24 hour(s)) METABOLIC PANEL, BASIC Collection Time: 19  4:50 AM  
Result Value Ref Range Sodium 138 136 - 145 mmol/L Potassium 3.9 3.5 - 5.5 mmol/L Chloride 105 100 - 108 mmol/L  
 CO2 27 21 - 32 mmol/L Anion gap 6 3.0 - 18 mmol/L Glucose 103 (H) 74 - 99 mg/dL BUN 23 (H) 7.0 - 18 MG/DL Creatinine 1.03 0.6 - 1.3 MG/DL  
 BUN/Creatinine ratio 22 (H) 12 - 20 GFR est AA >60 >60 ml/min/1.73m2 GFR est non-AA >60 >60 ml/min/1.73m2 Calcium 8.0 (L) 8.5 - 10.1 MG/DL  
CBC W/O DIFF Collection Time: 19  4:50 AM  
Result Value Ref Range WBC 6.5 4.6 - 13.2 K/uL  
 RBC 2.62 (L) 4.70 - 5.50 M/uL HGB 7.8 (L) 13.0 - 16.0 g/dL HCT 24.7 (L) 36.0 - 48.0 % MCV 94.3 74.0 - 97.0 FL  
 MCH 29.8 24.0 - 34.0 PG  
 MCHC 31.6 31.0 - 37.0 g/dL  
 RDW 15.0 (H) 11.6 - 14.5 % PLATELET 449 616 - 281 K/uL MPV 9.9 9.2 - 11.8 FL Assessment:  
 
Principal Problem: 
  Lower extremity ulceration, left, with fat layer exposed ([facility] (2019) Active Problems: 
  PAD (peripheral artery disease) ([facility] (2012) DM (diabetes mellitus) ([facility] (2018) HTN (hypertension) (12/22/2018) Anemia (1/25/2019) Wound infection (1/25/2019) POD#2 medial lateral L AKA wound debridement - no exposed femur Plan/Recommendations/Medical Decision Making:  
Change to aquacel Ag dresssing Waiting for rehab / SNF disposition. Pt has chronic indwelling bland - in place on arrival.  
 
[place]. [address] [place] [address] [place], [address] [facility] - [phone] January 27, 2019 
2:57 PM

## 2019-01-27 NOTE — PROGRESS NOTES
1944 Received patient from [facility]. Patient is alert and oriented x 3. Not aware of time. 2215 Patient requested fresh ice water. 4479 Bedside and Verbal shift change report given to Lorie Green RN (oncoming nurse) by Karine Marcus RN (offgoing nurse). Report included the following information SBAR, Kardex, Intake/Output, MAR and Recent Results.

## 2019-01-27 NOTE — ROUTINE PROCESS
Bedside and Verbal shift change report given to Johnie Sanders RN (oncoming nurse) by Candace Velarde RN (offgoing nurse). Report included the following information SBAR, Kardex and MAR.

## 2019-01-27 NOTE — PROGRESS NOTES
5638 - received pt in room. Resting in bed. AO. Pain rated 4/10 to left leg. 1045 - observed Dr. Sofia Stanley performing a dressing change. Tolerating well. 1100 - Assessment completed. Pt is AOx3. VSS. Pt resting in bed watching TV, encouraged to use ICS. 1415 - observed pt sleeping. 1722 - pt eating dinner, elevated temp, tylenol administered and ICS encouraged. 1756 - repeated temp check, 98.5/pt using ICS independently.  
 
1832 - pt sitting up in bed using ICS, watching TV

## 2019-01-27 NOTE — PROGRESS NOTES
Nutrition initial assessment/Plan of care RECOMMENDATIONS:  
1. Consistent CHO Cardiac Diet 2. SF CIB TID 3. Monitor labs, weight and PO intake 4. RD to follow GOALS:  
1. PO intake meets >75% of protein/calorie needs by 2/1 
2. Weight Maintenance (+/- 1-2 lb) by 2/3 ASSESSMENT:  
Wt status is classified as underweight per BMI of 18.1. However question accuracy of current weight on record as per bed scale Wt is 143 lb. Labs noted. Pt w/ H/H (7.8/24.7). Nutrition recommendations listed. RD to follow. Nutrition Diagnoses:  
 Increased nutrient needs related to wound healing  as evidenced by left above the knee amputation revision. Nutrition Risk:  [] High  [] Moderate []  Low SUBJECTIVE/OBJECTIVE:  
 Pt admitted for left lower extremity ulceration w/ fat layer exposed. Pt is s/p Revision of left above the knee amputation on (1/25). Pt seen in room later in the day. Denies having any food allergies or problems chewing/swallowing, but poor dentition noted;  lb. Reports having a good appetite but that the food was awful where he was staying so he hadn't been eating as much. Does not care for the Ensure that is ordered but agreeable to try SF CIB for additional calories/protein. Variable weights recorded in documented records, but question accuracy of current weight recorded at 126 lb. During visit used bed scale and was 143 lb. (3 blankets on bed) Pt stated his weight does fluctuate. Encouraged intake and will monitor. Information Obtained from:  
 [x] Chart Review [x] Patient 
 [] Family/Caregiver 
 [] Nurse/Physician 
 [] Interdisciplinary Meeting/Rounds Diet: Consistent CHO Cardiac Diet Medications: [x] Reviewed Allergies: [x] Reviewed Patient Active Problem List  
Diagnosis Code  PAD (peripheral artery disease) ([facility]) I73.9  Critical lower limb ischemia I99.8  DM (diabetes mellitus) ([facility] E11.9  
 HTN (hypertension) I10  
  Lower extremity ulceration, left, with fat layer exposed ([facility] L97.922  
 Anemia D64.9  Wound infection T14. 8XXA, L08.9 Past Medical History:  
Diagnosis Date  Anemia  Arthropathy  BPH (benign prostatic hypertrophy)  Diabetes mellitus ([facility]  Difficulty urinating  DVT (deep venous thrombosis) ([facility]  Elevated cholesterol  Esophageal reflux  GERD (gastroesophageal reflux disease)  Gout  Heartburn  Hypercholesteremia  Hyperlipidemia  Hypertension  Hypertrophy of prostate with urinary obstruction and other lower urinary tract symptoms (LUTS)  Hyponatremia  Loose, teeth  Nocturia  PAD (peripheral artery disease) ([facility]  Peptic ulcer of stomach  Pneumonia  Prostate disease  Seizures ([facility]  Unspecified epilepsy without mention of intractable epilepsy Labs:   
Lab Results Component Value Date/Time Sodium 138 01/27/2019 04:50 AM  
 Potassium 3.9 01/27/2019 04:50 AM  
 Chloride 105 01/27/2019 04:50 AM  
 CO2 27 01/27/2019 04:50 AM  
 Anion gap 6 01/27/2019 04:50 AM  
 Glucose 103 (H) 01/27/2019 04:50 AM  
 BUN 23 (H) 01/27/2019 04:50 AM  
 Creatinine 1.03 01/27/2019 04:50 AM  
 Calcium 8.0 (L) 01/27/2019 04:50 AM  
 
Question accuracy of current weight at 126 lb Bed scale was 143 lb during visit (3 blankets) Anthropometrics: BMI (calculated): 18.1 Last 3 Recorded Weights in this Encounter 01/23/19 1603 Weight: 57.2 kg (126 lb) Ht Readings from Last 1 Encounters:  
01/24/19 5' 10\" (1.778 m) Weight Metrics 1/24/2019 1/23/2019 12/21/2018 9/8/2016 9/8/2015 2/11/2015 7/24/2014 Weight - 126 lb 148 lb 8 oz 140 lb 140 lb 140 lb 140 lb BMI 18.08 kg/m2 - 25.49 kg/m2 24.03 kg/m2 24.02 kg/m2 24.02 kg/m2 24.81 kg/m2 No data found. IBW: 144 lb %IBW: 88% UBW: 140 lb (adjusted for AKA) [] Weight Loss [] Weight Gain [x] Weight Stable? Estimated Nutrition Needs: [x] MSJ  [] Other: Calories: 1791 kcal Based on:   [x] Actual BW   
Protein:  76-95 g Based on:   [x] Actual BW Fluid:       5889-7549 ml Based on:   [x] Actual BW  
 
 [x] No Cultural, Congregation or ethnic dietary need identified. [] Cultural, Congregation and ethnic food preferences identified and addressed Wt Status:  [] Normal (18.6 - 24.9) [x] Underweight (<18.5) [] Overweight (25 - 29.9) [] Mild Obesity (30 - 34.9)  [] Moderate Obesity (35 - 39.9) [] Morbid Obesity (40+) Nutrition Problems Identified:  
[] Suboptimal PO intake  
[] Food Allergies [x] Difficulty chewing/swallowing/poor dentition 
[] Constipation/Diarrhea  
[] Nausea/Vomiting  
[] None 
[x] Other: Wound healing Plan:  
[x] Therapeutic Diet 
[]  Obtained/adjusted food preferences/tolerances and/or snacks options [x]  Supplements added  
[] Occupational therapy following for feeding techniques []  HS snack added  
[]  Modify diet texture  
[]  Modify diet for food allergies []  Educate patient  
[]  Assist with menu selection  
[x]  Monitor PO intake on meal rounds  
[x]  Continue inpatient monitoring and intervention  
[]  Participated in discharge planning/Interdisciplinary rounds/Team meetings  
[]  Other:  
 
Education Needs: 
 [] Not appropriate for teaching at this time due to: 
 [x] Identified and addressed Nutrition Monitoring and Evaluation: 
[x] Continue ongoing monitoring and intervention 
[] Other Larena Ask

## 2019-01-27 NOTE — PROGRESS NOTES
Problem: Falls - Risk of 
Goal: *Absence of Falls Document Ahsan Garcia Fall Risk and appropriate interventions in the flowsheet. Outcome: Progressing Towards Goal 
Fall Risk Interventions: 
Mobility Interventions: Patient to call before getting OOB, Utilize walker, cane, or other assistive device, PT Consult for mobility concerns, PT Consult for assist device competence Mentation Interventions: Door open when patient unattended, Evaluate medications/consider consulting pharmacy, More frequent rounding Medication Interventions: Assess postural VS orthostatic hypotension, Evaluate medications/consider consulting pharmacy, Patient to call before getting OOB, Teach patient to arise slowly Elimination Interventions: Call light in reach, Patient to call for help with toileting needs History of Falls Interventions: Door open when patient unattended, Consult care management for discharge planning Problem: Pressure Injury - Risk of 
Goal: *Prevention of pressure injury Document Lior Scale and appropriate interventions in the flowsheet. Outcome: Progressing Towards Goal 
Pressure Injury Interventions: 
Sensory Interventions: Assess changes in LOC, Keep linens dry and wrinkle-free, Maintain/enhance activity level Moisture Interventions: Absorbent underpads Activity Interventions: Increase time out of bed, PT/OT evaluation Mobility Interventions: HOB 30 degrees or less, PT/OT evaluation Nutrition Interventions: Document food/fluid/supplement intake Friction and Shear Interventions: HOB 30 degrees or less

## 2019-01-27 NOTE — PROGRESS NOTES
1349: PT treatment session attempted, patient refusing to participate. Reports that he is too fatigued and is having L LE pain. States Letart we do it tomorrow. \" 
 
Will f/u with patient. Heidi Arzola PT, DPT Office extension: [phone] Pager #: [phone]

## 2019-01-27 NOTE — PROGRESS NOTES
Bedside and Verbal shift change report given to Marino Connell RN (oncoming nurse) by Ebonie Chapin RN (offgoing nurse). Report included the following information SBAR, Kardex and MAR.  
 
1005- Pt removed LLE dressing. Educated pt on infection prevention, hand hygiene and told him to keep wound dressed. 1200- Dressing changed on LLE with 4x4, gauze franco and elastic bandage. Pt tolerated well. 1530- Dressing change to Aquacel Ag completed as per MD order. Elastic bandage placed to help keep in place. Pt tolerated well. 1543- Pt medicated for pain. Pain 6/10.  
 
1740- Pt resting in bed comfortably. No distress noted. Bedside and Verbal shift change report given to Ebonie Chapin RN (oncoming nurse) by Marino Connell RN (offgoing nurse). Report included the following information SBAR, Kardex and MAR.

## 2019-01-28 VITALS
RESPIRATION RATE: 16 BRPM | TEMPERATURE: 97 F | OXYGEN SATURATION: 100 % | BODY MASS INDEX: 18.04 KG/M2 | SYSTOLIC BLOOD PRESSURE: 136 MMHG | DIASTOLIC BLOOD PRESSURE: 84 MMHG | WEIGHT: 126 LBS | HEIGHT: 70 IN | HEART RATE: 90 BPM

## 2019-01-28 PROCEDURE — 97530 THERAPEUTIC ACTIVITIES: CPT

## 2019-01-28 PROCEDURE — 74011250636 HC RX REV CODE- 250/636: Performed by: SURGERY

## 2019-01-28 PROCEDURE — 74011250637 HC RX REV CODE- 250/637: Performed by: HOSPITALIST

## 2019-01-28 PROCEDURE — 77010033678 HC OXYGEN DAILY

## 2019-01-28 PROCEDURE — 74011250637 HC RX REV CODE- 250/637: Performed by: SURGERY

## 2019-01-28 RX ORDER — HYDROCODONE BITARTRATE AND ACETAMINOPHEN 5; 325 MG/1; MG/1
1 TABLET ORAL
Qty: 30 TAB | Refills: 0 | Status: SHIPPED | OUTPATIENT
Start: 2019-01-28

## 2019-01-28 RX ADMIN — OXYCODONE AND ACETAMINOPHEN 1 TABLET: 5; 325 TABLET ORAL at 16:13

## 2019-01-28 RX ADMIN — OXYCODONE AND ACETAMINOPHEN 1 TABLET: 5; 325 TABLET ORAL at 04:51

## 2019-01-28 RX ADMIN — TAMSULOSIN HYDROCHLORIDE 0.4 MG: 0.4 CAPSULE ORAL at 09:57

## 2019-01-28 RX ADMIN — OLMESARTAN MEDOXOMIL 40 MG: 20 TABLET, COATED ORAL at 09:52

## 2019-01-28 RX ADMIN — FAMOTIDINE 20 MG: 20 TABLET ORAL at 09:56

## 2019-01-28 RX ADMIN — CLOPIDOGREL BISULFATE 75 MG: 75 TABLET, FILM COATED ORAL at 09:52

## 2019-01-28 RX ADMIN — CARVEDILOL 25 MG: 25 TABLET, FILM COATED ORAL at 08:04

## 2019-01-28 RX ADMIN — FUROSEMIDE 20 MG: 20 TABLET ORAL at 09:57

## 2019-01-28 RX ADMIN — ALLOPURINOL 100 MG: 100 TABLET ORAL at 09:50

## 2019-01-28 RX ADMIN — GABAPENTIN 400 MG: 400 CAPSULE ORAL at 09:54

## 2019-01-28 RX ADMIN — MEMANTINE HYDROCHLORIDE 10 MG: 10 TABLET ORAL at 09:53

## 2019-01-28 RX ADMIN — COLCHICINE 0.6 MG: 0.6 TABLET, FILM COATED ORAL at 09:00

## 2019-01-28 RX ADMIN — DOXYCYCLINE 100 MG: 100 CAPSULE ORAL at 09:51

## 2019-01-28 RX ADMIN — TIOTROPIUM BROMIDE 18 MCG: 18 CAPSULE ORAL; RESPIRATORY (INHALATION) at 10:00

## 2019-01-28 RX ADMIN — LEVETIRACETAM 750 MG: 500 TABLET, FILM COATED ORAL at 09:53

## 2019-01-28 RX ADMIN — AMLODIPINE BESYLATE 5 MG: 5 TABLET ORAL at 09:51

## 2019-01-28 RX ADMIN — CARVEDILOL 25 MG: 25 TABLET, FILM COATED ORAL at 16:13

## 2019-01-28 RX ADMIN — DOCUSATE SODIUM 100 MG: 100 CAPSULE, LIQUID FILLED ORAL at 09:54

## 2019-01-28 RX ADMIN — HEPARIN SODIUM 5000 UNITS: 5000 INJECTION INTRAVENOUS; SUBCUTANEOUS at 04:44

## 2019-01-28 RX ADMIN — GABAPENTIN 400 MG: 400 CAPSULE ORAL at 16:13

## 2019-01-28 NOTE — DISCHARGE INSTRUCTIONS
DISCHARGE SUMMARY from Nurse    PATIENT INSTRUCTIONS:    After general anesthesia or intravenous sedation, for 24 hours or while taking prescription Narcotics:  · Limit your activities  · Do not drive and operate hazardous machinery  · Do not make important personal or business decisions  · Do  not drink alcoholic beverages  · If you have not urinated within 8 hours after discharge, please contact your surgeon on call. Report the following to your surgeon:  · Excessive pain, swelling, redness or odor of or around the surgical area  · Temperature over 100.5  · Nausea and vomiting lasting longer than 4 hours or if unable to take medications  · Any signs of decreased circulation or nerve impairment to extremity: change in color, persistent  numbness, tingling, coldness or increase pain  · Any questions    What to do at Home:  Recommended activity: Activity as tolerated. If you experience any of the symptoms above, please follow up with Dr. Alba Durham. *  Please give a list of your current medications to your Primary Care Provider. *  Please update this list whenever your medications are discontinued, doses are      changed, or new medications (including over-the-counter products) are added. *  Please carry medication information at all times in case of emergency situations. These are general instructions for a healthy lifestyle:    No smoking/ No tobacco products/ Avoid exposure to second hand smoke  Surgeon General's Warning:  Quitting smoking now greatly reduces serious risk to your health.     Obesity, smoking, and sedentary lifestyle greatly increases your risk for illness    A healthy diet, regular physical exercise & weight monitoring are important for maintaining a healthy lifestyle    You may be retaining fluid if you have a history of heart failure or if you experience any of the following symptoms:  Weight gain of 3 pounds or more overnight or 5 pounds in a week, increased swelling in our hands or feet or shortness of breath while lying flat in bed. Please call your doctor as soon as you notice any of these symptoms; do not wait until your next office visit. Recognize signs and symptoms of STROKE:    F-face looks uneven    A-arms unable to move or move unevenly    S-speech slurred or non-existent    T-time-call 911 as soon as signs and symptoms begin-DO NOT go       Back to bed or wait to see if you get better-TIME IS BRAIN. Warning Signs of HEART ATTACK     Call 911 if you have these symptoms:   Chest discomfort. Most heart attacks involve discomfort in the center of the chest that lasts more than a few minutes, or that goes away and comes back. It can feel like uncomfortable pressure, squeezing, fullness, or pain.  Discomfort in other areas of the upper body. Symptoms can include pain or discomfort in one or both arms, the back, neck, jaw, or stomach.  Shortness of breath with or without chest discomfort.  Other signs may include breaking out in a cold sweat, nausea, or lightheadedness. Don't wait more than five minutes to call 911 - MINUTES MATTER! Fast action can save your life. Calling 911 is almost always the fastest way to get lifesaving treatment. Emergency Medical Services staff can begin treatment when they arrive -- up to an hour sooner than if someone gets to the hospital by car. The discharge information has been reviewed with the patient. The patient verbalized understanding. Discharge medications reviewed with the patient and appropriate educational materials and side effects teaching were provided. Patient armband removed and shredded.   ___________________________________________________________________________________________________________________________________

## 2019-01-28 NOTE — ANCILLARY DISCHARGE INSTRUCTIONS
RRAT Score is 22 - Patient is discharged to [facility], AN AFFILIATE OF [facility].

## 2019-01-28 NOTE — DISCHARGE SUMMARY
[facility]  [address] [facility]. [facility], [address]  Dr. Ricci Conley, Dr. Nora Talamantes , Dr. Bc Montemayor  [phone] FAX# [phone]      Discharge Summary     Patient: Sandhya Argueta MRN: [id number]  SSN: [id number]    YOB: 1945  Age: 74 y.o. Sex: male       Admit Date: 1/24/2019    Discharge Date: 1/28/2019      Admission Diagnoses: i73. 9;Lower extremity ulceration, left, with fat layer exposed [facility])    Discharge Diagnoses:   Problem List as of 1/28/2019 Date Reviewed: 9/8/2016          Codes Class Noted - Resolved    Anemia ICD-10-CM: D64.9  ICD-9-CM: 285.9  1/25/2019 - Present        Wound infection ICD-10-CM: T14. 8XXA, L08.9  ICD-9-CM: 958.3  1/25/2019 - Present        * (Principal) Lower extremity ulceration, left, with fat layer exposed ([facility] ICD-10-CM: M91.534  ICD-9-CM: 707.10  1/24/2019 - Present        DM (diabetes mellitus) ([facility] ICD-10-CM: E11.9  ICD-9-CM: 250.00  12/22/2018 - Present        HTN (hypertension) ICD-10-CM: I10  ICD-9-CM: 401.9  12/22/2018 - Present        Critical lower limb ischemia ICD-10-CM: I99.8  ICD-9-CM: 459.9  12/21/2018 - Present        PAD (peripheral artery disease) ([facility] ICD-10-CM: I73.9  ICD-9-CM: 443.9  9/17/2012 - Present               Discharge Condition: Good    Hospital Course: Admitted for left AKA revision. Post-operatively did well. Pain controlled. No fever. Minimal drainage. On 1/29/19 deemed satisfactory for discharge to facility. Consults: Internal Medicine      Disposition: SNF    Discharge Medications:   Current Discharge Medication List      START taking these medications    Details   HYDROcodone-acetaminophen (NORCO) 5-325 mg per tablet Take 1 Tab by mouth every six (6) hours as needed for Pain. Max Daily Amount: 4 Tabs.   Qty: 30 Tab, Refills: 0    Associated Diagnoses: Critical lower limb ischemia         CONTINUE these medications which have NOT CHANGED Details   cephALEXin (KEFLEX) 500 mg capsule Take 500 mg by mouth four (4) times daily. oxyCODONE-acetaminophen (PERCOCET) 5-325 mg per tablet Take 1 Tab by mouth every six (6) hours as needed for Pain. Max Daily Amount: 4 Tabs. Qty: 30 Tab, Refills: 0    Associated Diagnoses: Critical lower limb ischemia      allopurinol (ZYLOPRIM) 100 mg tablet Take  by mouth daily. calcium citrate-vitamin d3 (CITRACAL+D) 315-200 mg-unit tab Take 1 Tab by mouth daily (with breakfast). capsicum oleoresin 0.025 % topical cream Apply  to affected area three (3) times daily. carvedilol (COREG) 25 mg tablet Take 25 mg by mouth two (2) times daily (with meals). docusate sodium (COLACE) 50 mg capsule Take 50 mg by mouth two (2) times a day. ergocalciferol (VITAMIN D2) 50,000 unit capsule Take 50,000 Units by mouth every seven (7) days. furosemide (LASIX) 20 mg tablet Take  by mouth daily. memantine (NAMENDA) 10 mg tablet Take  by mouth daily. gabapentin (NEURONTIN) 400 mg capsule Take 400 mg by mouth daily. potassium citrate (UROCIT-K10) 10 mEq (1,080 mg) TbER Take  by mouth. amLODIPine (NORVASC) 5 mg tablet Take 5 mg by mouth daily. tamsulosin (FLOMAX) 0.4 mg capsule Take 1 Cap by mouth daily. Qty: 90 Cap, Refills: 3      colchicine (COLCRYS) 0.6 mg tablet Take 0.6 mg by mouth daily. atorvastatin (LIPITOR) 40 mg tablet Take  by mouth daily. phenytoin ER (DILANTIN) 100 mg ER capsule Take 100 mg by mouth daily. hydrALAZINE (APRESOLINE) 25 mg tablet Take 25 mg by mouth three (3) times daily. ferrous sulfate (FEOSOL) 325 mg (65 mg iron) tablet Take  by mouth Daily (before breakfast). ranitidine (ZANTAC) 150 mg tablet Take 150 mg by mouth two (2) times a day. levETIRAcetam (KEPPRA) 500 mg tablet Take  by mouth two (2) times a day. umeclidinium (INCRUSE ELLIPTA) 62.5 mcg/actuation inhaler Take 1 Puff by inhalation daily. clopidogrel (PLAVIX) 75 mg tab Take  by mouth. Activity: Activity as tolerated  Diet: Resume previous diet  Wound Care: Aquacel AG to left leg wounds every other day and cover with dry gauze.      Follow-up Appointments   Procedures    FOLLOW UP VISIT Appointment in: One Month     Standing Status:   Standing     Number of Occurrences:   1     Order Specific Question:   Appointment in     Answer:   One Month       Signed By: Benna Rinne, MD     January 28, 2019

## 2019-01-28 NOTE — PROGRESS NOTES
Pt to return to [facility] and rehab. Spoke to tashia in admit there. He will go to unit 2 room 310.

## 2019-01-28 NOTE — PROGRESS NOTES
Transport set to [facility] with [facility] by richar for 4pm. Pcs completed. Envelope in nurses station. Plan snf/ltc at [facility]. Care Management Interventions Mode of Transport at Discharge: B[place] [facility]) Transition of Care Consult (CM Consult): Discharge Planning MyChart Signup: No 
Discharge Durable Medical Equipment: No 
Physical Therapy Consult: No 
Occupational Therapy Consult: No 
Speech Therapy Consult: No 
Current Support Network: Nursing Facility(lives long term care a t [facility] and [address] [place] [address]) Confirm Follow Up Transport: Other (see comment) Plan discussed with Pt/Family/Caregiver: Yes Discharge Location Discharge Placement: Long Term Care/snf

## 2019-01-28 NOTE — PROGRESS NOTES
1525 report called to [facility], AN AFFILIATE OF [facility], [address], spoke with nurse Duy Willis, informed of: pt status, procedures, pain med to be given prior to d/c from hospital, dressing, returning with bland, no questions or concerns at end of report, Carson GONZALEZ

## 2019-01-28 NOTE — PROGRESS NOTES
Problem: Mobility Impaired (Adult and Pediatric) Goal: *Acute Goals and Plan of Care (Insert Text) Physical Therapy Goals Initiated 1/26/2019 and to be accomplished within 7 day(s) 1. Patient will move from supine to sit and sit to supine , scoot up and down and roll side to side in bed with minimal assistance/contact guard assist.    
2.  Patient will transfer from bed to chair and chair to bed with minimal assistance/contact guard assist using the least restrictive device. 3.  Patient will perform sit to stand with minimal assistance/contact guard assist. 
4.  Patient will ambulate with minimal assistance/contact guard assist for >/= 25 feet with the least restrictive device. 5.  Patient will demonstrate independence with performance of LE exercises. Outcome: Progressing Towards Goal 
 
PHYSICAL THERAPY: Daily TREATMENT Note INPATIENT: Medicare: Hospital Day: 5 Patient: Leartis Nyhan (89 y.o. male)    Date: 1/28/2019 Primary Diagnosis: i73.9 Lower extremity ulceration, left, with fat layer exposed ([address] Procedure(s) (LRB): 
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   
 
 
Chart, physical therapy assessment, plan of care and goals were reviewed. PLOF:at LTC ASSESSMENT: 
Pt pleasant, motivated, progressing well today. Supervision for bed mobility and transfer to seated, good static and dynamic seated balance. Pt mod a X2 for first sit<>stand trial with verbal cues for safety, pt able to maintain good self supported standing balance using RW. Second trial pt performed SPT with min a X2 bed>WC with verbal cues for hand placement. Progression toward goals: 
      Improving appropriately and progressing toward goals PLAN: 
Patient continues to benefit from skilled intervention to address the above impairments. Continue treatment per established plan of care. EDUCATION:  
Education:  Patient was educated on the following topics: exercises Barriers to Learning/Limitations: None Compensate with: visual, verbal, tactile, kinesthetic cues/model Discharge Recommendations:  LTC Further Equipment Recommendations for Discharge:  N/A Factors which may impact discharge planning: none SUBJECTIVE:  
Patient stated I havent tried to stand up yet.  OBJECTIVE DATA SUMMARY:  
Critical Behavior: 
Neurologic State: Alert Orientation Level: Oriented X4 Cognition: Follows commands Functional Mobility: 
 
 
Functional Status Indep (I) Mod I Super-vision Min A Mod A Max A Total A Assist x2 Verbal cues Additional time Not tested Comments Rolling []  []  [x] []    []    []  []  [] [] [x] [] Supine to sit []  []  [x] []  []  []  []  [] [] [x] [] Sit to supine []  []  [] []  []  []  []  [] [] [] [x] Sit to stand []  []  [] [x]  []  []  []  [x] [x] [x] [] Stand to sit []  []  [] [x]  []  []  []  [x] [x] [x] [] Bed to chair transfers []  []  [] [x]  []  []  []  [x] [x] [x] [] Balance Good Pervis Slice Poor Unable Not tested Comments Sitting static [x]  []  []  []  [] Sitting dynamic [x]  []  []  []  []   
Standing static [x]  []  []  []  []   
Standing dynamic []  [x]  []  []  [] Therapeutic Exercises:  
 
 
 
EXERCISE Sets Reps Active Active Assist  
Passive Self ROM Comments Ankle Pumps 1 10  [x] [] [] [] Quad Sets/Glut Sets 1 10 [x] [] [] [] Hamstring Sets   [] [] [] [] Short Arc Quads   [] [] [] [] Heel Slides   [] [] [] [] Straight Leg Raises 1 10 [x] [] [] [] Hip Abd/Add 1 10 [x] [] [] [] Long Arc Quads   [] [] [] [] Seated Marching   [] [] [] []   
Standing Marching   [] [] [] []   
   [] [] [] []   
 
 
Vital Signs Temp: 97.4 °F (36.3 °C) Pulse (Heart Rate): 76 BP: 151/68 Resp Rate: 16    
O2 Sat (%): 100 %Pain:Pre treatment pain level:4 Post treatment pain level:4Pain Scale 1: Numeric (0 - 10) Pain Intensity 1: 4 Pain Location 1: Leg 
 Pain Orientation 1: Left Pain Description 1: Aching Pain Intervention(s) 1: Medication (see MAR) Activity Tolerance:  
Good After treatment:  
Patient left in no apparent distress sitting up in chair Call bell left within reach Nursing notified Mini Lara PTA Time Calculation: 23 mins

## 2019-01-28 NOTE — PROGRESS NOTES
Transportation at Discharge: 1/28/19 Transport Company/Representative:  Doni Leonard / Radha Otero [employer] Phone number: [phone] Method of Transport: Guerline Shepard / Marquez Linton Estimated pick-up time: 4:00P Destination: [employer] and [address] [place] [address] Insurance Info: Medicare / [facility] Authorization: [id number] Per Clair morgan/ Cody Steele Requesting Outcomes Manager:  Scooter Stockton, Care- ext [phone]

## 2019-01-28 NOTE — PROGRESS NOTES
[facility] 
[address] [facility], [address] Alessandra Quinones, Dr. Silvia Morales , Dr. Holli Horn [phone] FAX# [phone] PROGRESS NOTE Patient: Jonathan Ibrahim MRN: [id number]  SSN: [id number] YOB: 1945  Age: 74 y.o. Sex: male Date: 1/28/2019 Hospital: [facility] Post-Op Day: 3 Plan:  
increase activity and out of bed Aquacel AG to left leg wounds daily. Plan for discharge to facility today. Assessment:  
good progress and wounds fine Subjective: No pain today. Not required Objective:  
Admit weight: Weight: 57.2 kg (126 lb) Last recorded weight: Weight: 57.2 kg (126 lb) Visit Vitals /68 Pulse 76 Temp 97.4 °F (36.3 °C) Resp 16 Ht 5' 10\" (1.778 m) Wt 57.2 kg (126 lb) SpO2 100% BMI 18.08 kg/m² Intake/Output Summary (Last 24 hours) at 1/28/2019 4415 Last data filed at 1/28/2019 3292 Gross per 24 hour Intake  Output 1000 ml Net -1000 ml Physical exam was negative except for: L:eft leg wounds with minimal drainage. Labs:  
 
Recent Labs  
  01/27/19 
0450 WBC 6.5 HGB 7.8* HCT 24.7*  
 RDW 15.0* Recent Labs  
  01/27/19 
0450   
K 3.9  CO2 27 * BUN 23* CREA 1.03  
CA 8.0* No results for input(s): PH, PCO2, PO2, HCO3, FIO2 in the last 72 hours.  
 
 
Forrest Cohen MD, FACS

## 2019-01-28 NOTE — ANCILLARY DISCHARGE INSTRUCTIONS
Patient and/or next of kin has been given the [facility] Important Message From Medicare About Your Rights\" letter and all questions were answered.

## 2019-01-28 NOTE — PROGRESS NOTES
Dc summary placed in envelope  Family member here and will transport pt's wc to [facility], if transport cannot take.

## 2019-01-31 ENCOUNTER — PATIENT OUTREACH (OUTPATIENT)
Dept: CASE MANAGEMENT | Age: 74
End: 2019-01-31

## 2019-02-07 ENCOUNTER — PATIENT OUTREACH (OUTPATIENT)
Dept: CASE MANAGEMENT | Age: 74
End: 2019-02-07

## 2019-02-14 ENCOUNTER — PATIENT OUTREACH (OUTPATIENT)
Dept: CASE MANAGEMENT | Age: 74
End: 2019-02-14

## 2019-02-21 ENCOUNTER — PATIENT OUTREACH (OUTPATIENT)
Dept: CASE MANAGEMENT | Age: 74
End: 2019-02-21

## 2019-02-26 NOTE — PROGRESS NOTES
Community Care Team Documentation for Patient in [facility]     Patient discharged from [facility] 1/24/2019 - 1/28/2019 to [facility], [address], on 1/28/2019. Hospital Discharge diagnosis:  left AKA revision    SNF Attending Provider:      Anticipated discharge date from SNF:  N/a: LTC      PCP : Lisa Rivas MD    Nurse Navigator:     [facility] Team rounds completed, updates provided by facility. Amb with walker 20-30ft, needs cues but is making functional progress. Will follow for 30 day MIGUEL ANGEL    Medium Risk            13       Total Score        3 Has Seen PCP in Last 6 Months (Yes=3, No=0)    2 . Living with Significant Other. Assisted Living. LTAC. SNF. or   Rehab    4 IP Visits Last 12 Months (1-3=4, 4=9, >4=11)    4 Charlson Comorbidity Score (Age + Comorbid Conditions)        Criteria that do not apply:    Patient Length of Stay (>5 days = 3)    Pt.  Coverage (Medicare=5 , Medicaid, or Self-Pay=4)      Active Ambulatory Problems     Diagnosis Date Noted    PAD (peripheral artery disease) ([address] 09/17/2012    Critical lower limb ischemia 12/21/2018    DM (diabetes mellitus) ([address] 12/22/2018    HTN (hypertension) 12/22/2018    Lower extremity ulceration, left, with fat layer exposed ([address] 01/24/2019    Anemia 01/25/2019    Wound infection 01/25/2019     Resolved Ambulatory Problems     Diagnosis Date Noted    No Resolved Ambulatory Problems     Past Medical History:   Diagnosis Date    Anemia     Arthropathy     BPH (benign prostatic hypertrophy)     Diabetes mellitus (HCC)     Difficulty urinating     DVT (deep venous thrombosis) (HCC)     Elevated cholesterol     Esophageal reflux     GERD (gastroesophageal reflux disease)     Gout     Heartburn     Hypercholesteremia     Hyperlipidemia     Hypertension     Hypertrophy of prostate with urinary obstruction and other lower urinary tract symptoms (LUTS)     Hyponatremia     Loose, teeth     Nocturia     PAD (peripheral artery disease) (HCC)     Peptic ulcer of stomach     Pneumonia     Prostate disease     Seizures (HCC)     Unspecified epilepsy without mention of intractable epilepsy

## 2019-02-26 NOTE — PROGRESS NOTES
Community Care Team Documentation for Patient in [facility]     Patient discharged from [facility] 1/24/2019 - 1/28/2019 to [facility], [address], on 1/28/2019. Hospital Discharge diagnosis:  left AKA revision    SNF Attending Provider:      Anticipated discharge date from SNF:  N/a: LTC      PCP : Maria Del Rosario Montoya MD    Nurse Navigator:     [facility] Team rounds completed, updates provided by facility. working on [place], skilled until early march? Surgical site dehiscence  Will follow for 30 day MIGUEL ANGEL    Medium Risk            13       Total Score        3 Has Seen PCP in Last 6 Months (Yes=3, No=0)    2 . Living with Significant Other. Assisted Living. LTAC. SNF. or   Rehab    4 IP Visits Last 12 Months (1-3=4, 4=9, >4=11)    4 Charlson Comorbidity Score (Age + Comorbid Conditions)        Criteria that do not apply:    Patient Length of Stay (>5 days = 3)    Pt.  Coverage (Medicare=5 , Medicaid, or Self-Pay=4)      Active Ambulatory Problems     Diagnosis Date Noted    PAD (peripheral artery disease) ([address] 09/17/2012    Critical lower limb ischemia 12/21/2018    DM (diabetes mellitus) ([address] 12/22/2018    HTN (hypertension) 12/22/2018    Lower extremity ulceration, left, with fat layer exposed ([address] 01/24/2019    Anemia 01/25/2019    Wound infection 01/25/2019     Resolved Ambulatory Problems     Diagnosis Date Noted    No Resolved Ambulatory Problems     Past Medical History:   Diagnosis Date    Anemia     Arthropathy     BPH (benign prostatic hypertrophy)     Diabetes mellitus (HCC)     Difficulty urinating     DVT (deep venous thrombosis) (HCC)     Elevated cholesterol     Esophageal reflux     GERD (gastroesophageal reflux disease)     Gout     Heartburn     Hypercholesteremia     Hyperlipidemia     Hypertension     Hypertrophy of prostate with urinary obstruction and other lower urinary tract symptoms (LUTS)     Hyponatremia  Loose, teeth     Nocturia     PAD (peripheral artery disease) (HCC)     Peptic ulcer of stomach     Pneumonia     Prostate disease     Seizures (HCC)     Unspecified epilepsy without mention of intractable epilepsy

## 2019-02-26 NOTE — PROGRESS NOTES
Community Care Team Documentation for Patient in [facility]     Patient discharged from [facility] 1/24/2019 - 1/28/2019 to [facility], [address], on 1/28/2019. Hospital Discharge diagnosis:  left AKA revision    SNF Attending Provider:      Anticipated discharge date from SNF:  N/a: LTC      PCP : Roberto Jacobson MD    Nurse Navigator:     [facility] Team rounds completed, updates provided by facility. LTC, therapy to eval for skilled services. Medium Risk            13       Total Score        3 Has Seen PCP in Last 6 Months (Yes=3, No=0)    2 . Living with Significant Other. Assisted Living. LTAC. SNF. or   Rehab    4 IP Visits Last 12 Months (1-3=4, 4=9, >4=11)    4 Charlson Comorbidity Score (Age + Comorbid Conditions)        Criteria that do not apply:    Patient Length of Stay (>5 days = 3)    Pt.  Coverage (Medicare=5 , Medicaid, or Self-Pay=4)      Active Ambulatory Problems     Diagnosis Date Noted    PAD (peripheral artery disease) ([facility] 09/17/2012    Critical lower limb ischemia 12/21/2018    DM (diabetes mellitus) ([facility]) 12/22/2018    HTN (hypertension) 12/22/2018    Lower extremity ulceration, left, with fat layer exposed ([facility]) 01/24/2019    Anemia 01/25/2019    Wound infection 01/25/2019     Resolved Ambulatory Problems     Diagnosis Date Noted    No Resolved Ambulatory Problems     Past Medical History:   Diagnosis Date    Anemia     Arthropathy     BPH (benign prostatic hypertrophy)     Diabetes mellitus (HCC)     Difficulty urinating     DVT (deep venous thrombosis) (HCC)     Elevated cholesterol     Esophageal reflux     GERD (gastroesophageal reflux disease)     Gout     Heartburn     Hypercholesteremia     Hyperlipidemia     Hypertension     Hypertrophy of prostate with urinary obstruction and other lower urinary tract symptoms (LUTS)     Hyponatremia     Loose, teeth     Nocturia     PAD (peripheral artery disease) ([facility]     Peptic ulcer of stomach     Pneumonia     Prostate disease     Seizures ([facility]     Unspecified epilepsy without mention of intractable epilepsy

## 2019-02-26 NOTE — PROGRESS NOTES
Community Care Team Documentation for Patient in [facility]     Patient discharged from [facility] 1/24/2019 - 1/28/2019 to [facility], [address], on 1/28/2019. Hospital Discharge diagnosis:  left AKA revision    SNF Attending Provider:      Anticipated discharge date from SNF:  N/a: LTC      PCP : Maria Del Rosario Montoya MD    Nurse Navigator:     [facility] Team rounds completed, updates provided by facility. PT picked up,  doing well. LTC. Will follow for 30 day MIGUEL ANGEL    Medium Risk            13       Total Score        3 Has Seen PCP in Last 6 Months (Yes=3, No=0)    2 . Living with Significant Other. Assisted Living. LTAC. SNF. or   Rehab    4 IP Visits Last 12 Months (1-3=4, 4=9, >4=11)    4 Charlson Comorbidity Score (Age + Comorbid Conditions)        Criteria that do not apply:    Patient Length of Stay (>5 days = 3)    Pt.  Coverage (Medicare=5 , Medicaid, or Self-Pay=4)      Active Ambulatory Problems     Diagnosis Date Noted    PAD (peripheral artery disease) ([address] 09/17/2012    Critical lower limb ischemia 12/21/2018    DM (diabetes mellitus) ([address] 12/22/2018    HTN (hypertension) 12/22/2018    Lower extremity ulceration, left, with fat layer exposed ([address] 01/24/2019    Anemia 01/25/2019    Wound infection 01/25/2019     Resolved Ambulatory Problems     Diagnosis Date Noted    No Resolved Ambulatory Problems     Past Medical History:   Diagnosis Date    Anemia     Arthropathy     BPH (benign prostatic hypertrophy)     Diabetes mellitus (HCC)     Difficulty urinating     DVT (deep venous thrombosis) (HCC)     Elevated cholesterol     Esophageal reflux     GERD (gastroesophageal reflux disease)     Gout     Heartburn     Hypercholesteremia     Hyperlipidemia     Hypertension     Hypertrophy of prostate with urinary obstruction and other lower urinary tract symptoms (LUTS)     Hyponatremia     Loose, teeth     Nocturia     PAD (peripheral artery disease) (HCC)     Peptic ulcer of stomach     Pneumonia     Prostate disease     [facility]     Unspecified epilepsy without mention of intractable epilepsy

## 2019-03-04 ENCOUNTER — PATIENT OUTREACH (OUTPATIENT)
Dept: CASE MANAGEMENT | Age: 74
End: 2019-03-04

## 2019-03-04 NOTE — PROGRESS NOTES
Community Care Team Documentation for Patient in [facility]     Patient discharged from [facility] 1/24/2019 - 1/28/2019 to [facility], [address], on 1/28/2019. Hospital Discharge diagnosis:  left AKA revision    SNF Attending Provider:      Anticipated discharge date from SNF:  N/a: LTC      PCP : Jayy Lorenzo MD    Nurse Navigator:     [facility] Team rounds completed, updates provided by facility. DC PT 3/6, medically stable,   Completed 30 day MIGUEL ANGEL, will close. Medium Risk            13       Total Score        3 Has Seen PCP in Last 6 Months (Yes=3, No=0)    2 . Living with Significant Other. Assisted Living. LTAC. SNF. or   Rehab    4 IP Visits Last 12 Months (1-3=4, 4=9, >4=11)    4 Charlson Comorbidity Score (Age + Comorbid Conditions)        Criteria that do not apply:    Patient Length of Stay (>5 days = 3)    Pt.  Coverage (Medicare=5 , Medicaid, or Self-Pay=4)      Active Ambulatory Problems     Diagnosis Date Noted    PAD (peripheral artery disease) ([address] 09/17/2012    Critical lower limb ischemia 12/21/2018    DM (diabetes mellitus) ([address] 12/22/2018    HTN (hypertension) 12/22/2018    Lower extremity ulceration, left, with fat layer exposed ([address] 01/24/2019    Anemia 01/25/2019    Wound infection 01/25/2019     Resolved Ambulatory Problems     Diagnosis Date Noted    No Resolved Ambulatory Problems     Past Medical History:   Diagnosis Date    Anemia     Arthropathy     BPH (benign prostatic hypertrophy)     Diabetes mellitus (HCC)     Difficulty urinating     DVT (deep venous thrombosis) (HCC)     Elevated cholesterol     Esophageal reflux     GERD (gastroesophageal reflux disease)     Gout     Heartburn     Hypercholesteremia     Hyperlipidemia     Hypertension     Hypertrophy of prostate with urinary obstruction and other lower urinary tract symptoms (LUTS)     Hyponatremia     Loose, teeth     Nocturia     PAD (peripheral artery disease) (HCC)     Peptic ulcer of stomach     Pneumonia     Prostate disease     Seizures (HCC)     Unspecified epilepsy without mention of intractable epilepsy

## 2024-08-08 NOTE — ANESTHESIA PREPROCEDURE EVALUATION
Anesthetic History   No history of anesthetic complications            Review of Systems / Medical History  Patient summary reviewed and pertinent labs reviewed    Pulmonary  Within defined limits                 Neuro/Psych              Cardiovascular    Hypertension: well controlled          PAD    Exercise tolerance: <4 METS     GI/Hepatic/Renal     GERD: well controlled      PUD     Endo/Other    Diabetes: using insulin    Arthritis     Other Findings              Physical Exam    Airway  Mallampati: II  TM Distance: 4 - 6 cm  Neck ROM: normal range of motion   Mouth opening: Normal     Cardiovascular  Regular rate and rhythm,  S1 and S2 normal,  no murmur, click, rub, or gallop             Dental      Comments: Few teeth in mouth   Pulmonary  Breath sounds clear to auscultation               Abdominal  GI exam deferred       Other Findings            Anesthetic Plan    ASA: 3  Anesthesia type: general          Induction: Intravenous  Anesthetic plan and risks discussed with: Patient The ultrasound noted a very small amount of fluid surrounding your testicles, called a hydrocele.  This will likely resolve on its own.  Motrin or Tylenol for any pain.  If does not start to improve in the next several days, make follow-up appointment with the urologist given.

## (undated) DEVICE — SUTURE ABSORBABLE BRAIDED 2-0 CT-1 27 IN UD VICRYL J259H

## (undated) DEVICE — SOLUTION IV 1000ML 0.9% SOD CHL

## (undated) DEVICE — (D)PREP SKN CHLRAPRP APPL 26ML -- CONVERT TO ITEM 371833

## (undated) DEVICE — CURITY NON-ADHERENT STRIPS: Brand: CURITY

## (undated) DEVICE — MEDI-VAC SUCTION HANDLE REGULAR CAPACITY: Brand: CARDINAL HEALTH

## (undated) DEVICE — SPONGE GZ W4XL4IN COT 12 PLY TYP VII WVN C FLD DSGN

## (undated) DEVICE — STAPLER SKIN H3.9MM WIRE DIA0.58MM CRWN 6.9MM 35 STPL FIX

## (undated) DEVICE — DEPAUL SHOULDER PACK: Brand: MEDLINE INDUSTRIES, INC.

## (undated) DEVICE — INSULATED BLADE ELECTRODE: Brand: EDGE

## (undated) DEVICE — KENDALL SCD EXPRESS SLEEVES, KNEE LENGTH, MEDIUM: Brand: KENDALL SCD

## (undated) DEVICE — REM POLYHESIVE ADULT PATIENT RETURN ELECTRODE: Brand: VALLEYLAB

## (undated) DEVICE — SUT SLK 4-0 18IN TIE MP BLK --

## (undated) DEVICE — SUTURE PERMAHAND SZ 2-0 L12X18IN NONABSORBABLE BLK SILK A185H

## (undated) DEVICE — PAD,ABDOMINAL,5"X9",STERILE,LF,1/PK: Brand: MEDLINE INDUSTRIES, INC.

## (undated) DEVICE — GOWN,AURORA,FABRIC-REINFORCED,X-LARGE: Brand: MEDLINE

## (undated) DEVICE — SPONGE HEMOSTAT CELLULS 4X8IN -- SURGICEL

## (undated) DEVICE — BANDAGE,GAUZE,BULKEE II,4.5"X4.1YD,STRL: Brand: MEDLINE

## (undated) DEVICE — INTENDED FOR TISSUE SEPARATION, AND OTHER PROCEDURES THAT REQUIRE A SHARP SURGICAL BLADE TO PUNCTURE OR CUT.: Brand: BARD-PARKER SAFETY BLADES SIZE 10, STERILE

## (undated) DEVICE — 12FR FRAZIER SUCTION HANDLE: Brand: CARDINAL HEALTH

## (undated) DEVICE — KERLIX BANDAGE ROLL: Brand: KERLIX

## (undated) DEVICE — OCCLUSIVE GAUZE STRIP,3% BISMUTH TRIBROMOPHENATE IN PETROLATUM BLEND: Brand: XEROFORM

## (undated) DEVICE — SPONGE LAP 18X18IN STRL -- 5/PK

## (undated) DEVICE — SUTURE PERMAHAND SZ 3-0 L18IN NONABSORBABLE BLK SILK BRAID A184H

## (undated) DEVICE — SSC BONE WAX: Brand: SSC BONE WAX

## (undated) DEVICE — SUTURE PERMAHAND SZ 3-0 L18IN NONABSORBABLE BLK L26MM SH C013D

## (undated) DEVICE — SUT SLK 2-0SH 30IN BLK --